# Patient Record
Sex: MALE | Race: BLACK OR AFRICAN AMERICAN | Employment: FULL TIME | URBAN - METROPOLITAN AREA
[De-identification: names, ages, dates, MRNs, and addresses within clinical notes are randomized per-mention and may not be internally consistent; named-entity substitution may affect disease eponyms.]

---

## 2017-01-01 ENCOUNTER — HOSPITAL ENCOUNTER (OUTPATIENT)
Dept: CT IMAGING | Age: 52
Discharge: HOME | End: 2017-08-22
Attending: INTERNAL MEDICINE | Admitting: INTERNAL MEDICINE

## 2017-01-01 ENCOUNTER — OFFICE VISIT (OUTPATIENT)
Dept: PULMONOLOGY | Age: 52
End: 2017-01-01

## 2017-01-01 ENCOUNTER — OFFICE VISIT (OUTPATIENT)
Dept: INTERNAL MEDICINE CLINIC | Age: 52
End: 2017-01-01

## 2017-01-01 ENCOUNTER — HOSPITAL ENCOUNTER (OUTPATIENT)
Dept: ENDOSCOPY | Age: 52
Discharge: HOME | End: 2017-10-20
Attending: INTERNAL MEDICINE | Admitting: INTERNAL MEDICINE

## 2017-01-01 ENCOUNTER — HOSPITAL ENCOUNTER (OUTPATIENT)
Dept: OTHER | Age: 52
Discharge: HOME | End: 2017-12-22
Attending: INTERNAL MEDICINE | Admitting: INTERNAL MEDICINE

## 2017-01-01 ENCOUNTER — OFFICE VISIT (OUTPATIENT)
Dept: CARDIOLOGY CLINIC | Age: 52
End: 2017-01-01

## 2017-01-01 ENCOUNTER — CARE COORDINATION (OUTPATIENT)
Dept: CASE MANAGEMENT | Age: 52
End: 2017-01-01

## 2017-01-01 ENCOUNTER — OFFICE VISIT (OUTPATIENT)
Dept: INFECTIOUS DISEASES | Age: 52
End: 2017-01-01

## 2017-01-01 ENCOUNTER — TELEPHONE (OUTPATIENT)
Dept: OTHER | Age: 52
End: 2017-01-01

## 2017-01-01 ENCOUNTER — HOSPITAL ENCOUNTER (OUTPATIENT)
Dept: CT IMAGING | Age: 52
Discharge: HOME | End: 2017-12-12
Attending: INTERNAL MEDICINE | Admitting: INTERNAL MEDICINE

## 2017-01-01 ENCOUNTER — HOSPITAL ENCOUNTER (OUTPATIENT)
Dept: OTHER | Age: 52
Discharge: HOME | End: 2017-12-31
Attending: INTERNAL MEDICINE | Admitting: INTERNAL MEDICINE

## 2017-01-01 ENCOUNTER — HOSPITAL ENCOUNTER (OUTPATIENT)
Dept: NON INVASIVE DIAGNOSTICS | Age: 52
Discharge: HOME | End: 2017-11-10
Attending: INTERNAL MEDICINE | Admitting: INTERNAL MEDICINE

## 2017-01-01 ENCOUNTER — HOSPITAL ENCOUNTER (OUTPATIENT)
Dept: NON INVASIVE DIAGNOSTICS | Age: 52
Discharge: HOME | End: 2017-08-22
Attending: INTERNAL MEDICINE | Admitting: INTERNAL MEDICINE

## 2017-01-01 ENCOUNTER — OFFICE VISIT (OUTPATIENT)
Dept: SURGERY | Age: 52
End: 2017-01-01

## 2017-01-01 ENCOUNTER — HOSPITAL ENCOUNTER (OUTPATIENT)
Dept: OTHER | Age: 52
Discharge: HOME | End: 2017-12-21
Attending: INTERNAL MEDICINE | Admitting: INTERNAL MEDICINE

## 2017-01-01 ENCOUNTER — SURG/PROC ORDERS (OUTPATIENT)
Dept: SURGERY | Age: 52
End: 2017-01-01

## 2017-01-01 ENCOUNTER — TELEPHONE (OUTPATIENT)
Dept: RHEUMATOLOGY | Age: 52
End: 2017-01-01

## 2017-01-01 VITALS
HEART RATE: 86 BPM | DIASTOLIC BLOOD PRESSURE: 82 MMHG | OXYGEN SATURATION: 94 % | WEIGHT: 246 LBS | BODY MASS INDEX: 35.22 KG/M2 | SYSTOLIC BLOOD PRESSURE: 100 MMHG | HEIGHT: 70 IN

## 2017-01-01 VITALS
HEART RATE: 72 BPM | SYSTOLIC BLOOD PRESSURE: 132 MMHG | WEIGHT: 246 LBS | DIASTOLIC BLOOD PRESSURE: 74 MMHG | BODY MASS INDEX: 35.3 KG/M2

## 2017-01-01 VITALS
WEIGHT: 245.8 LBS | HEART RATE: 88 BPM | DIASTOLIC BLOOD PRESSURE: 66 MMHG | SYSTOLIC BLOOD PRESSURE: 98 MMHG | HEIGHT: 70 IN | BODY MASS INDEX: 35.19 KG/M2

## 2017-01-01 VITALS
HEIGHT: 70 IN | BODY MASS INDEX: 36.22 KG/M2 | WEIGHT: 253 LBS | DIASTOLIC BLOOD PRESSURE: 60 MMHG | SYSTOLIC BLOOD PRESSURE: 110 MMHG

## 2017-01-01 VITALS
SYSTOLIC BLOOD PRESSURE: 99 MMHG | DIASTOLIC BLOOD PRESSURE: 80 MMHG | TEMPERATURE: 98 F | HEART RATE: 94 BPM | RESPIRATION RATE: 20 BRPM | OXYGEN SATURATION: 93 %

## 2017-01-01 VITALS
HEART RATE: 46 BPM | DIASTOLIC BLOOD PRESSURE: 64 MMHG | OXYGEN SATURATION: 94 % | WEIGHT: 246 LBS | BODY MASS INDEX: 35.3 KG/M2 | SYSTOLIC BLOOD PRESSURE: 110 MMHG

## 2017-01-01 VITALS
HEIGHT: 70 IN | SYSTOLIC BLOOD PRESSURE: 92 MMHG | OXYGEN SATURATION: 94 % | BODY MASS INDEX: 37.02 KG/M2 | DIASTOLIC BLOOD PRESSURE: 64 MMHG | HEART RATE: 100 BPM | WEIGHT: 258.6 LBS

## 2017-01-01 VITALS
HEIGHT: 70 IN | WEIGHT: 252 LBS | TEMPERATURE: 97.1 F | BODY MASS INDEX: 36.08 KG/M2 | SYSTOLIC BLOOD PRESSURE: 98 MMHG | DIASTOLIC BLOOD PRESSURE: 68 MMHG

## 2017-01-01 VITALS
HEIGHT: 70 IN | HEART RATE: 64 BPM | DIASTOLIC BLOOD PRESSURE: 74 MMHG | BODY MASS INDEX: 36.22 KG/M2 | WEIGHT: 253 LBS | SYSTOLIC BLOOD PRESSURE: 120 MMHG

## 2017-01-01 VITALS
BODY MASS INDEX: 34.9 KG/M2 | HEART RATE: 78 BPM | SYSTOLIC BLOOD PRESSURE: 100 MMHG | WEIGHT: 243.8 LBS | DIASTOLIC BLOOD PRESSURE: 62 MMHG | OXYGEN SATURATION: 98 % | RESPIRATION RATE: 16 BRPM | HEIGHT: 70 IN

## 2017-01-01 VITALS
BODY MASS INDEX: 34.65 KG/M2 | SYSTOLIC BLOOD PRESSURE: 120 MMHG | HEIGHT: 70 IN | HEART RATE: 88 BPM | DIASTOLIC BLOOD PRESSURE: 82 MMHG | WEIGHT: 242 LBS

## 2017-01-01 VITALS
SYSTOLIC BLOOD PRESSURE: 110 MMHG | DIASTOLIC BLOOD PRESSURE: 72 MMHG | WEIGHT: 255 LBS | HEART RATE: 88 BPM | BODY MASS INDEX: 36.59 KG/M2 | OXYGEN SATURATION: 96 %

## 2017-01-01 VITALS
HEART RATE: 56 BPM | WEIGHT: 250 LBS | BODY MASS INDEX: 35.79 KG/M2 | HEIGHT: 70 IN | SYSTOLIC BLOOD PRESSURE: 112 MMHG | DIASTOLIC BLOOD PRESSURE: 78 MMHG

## 2017-01-01 VITALS
DIASTOLIC BLOOD PRESSURE: 60 MMHG | SYSTOLIC BLOOD PRESSURE: 108 MMHG | HEIGHT: 70 IN | HEART RATE: 60 BPM | WEIGHT: 245.12 LBS | BODY MASS INDEX: 35.09 KG/M2

## 2017-01-01 VITALS
WEIGHT: 252 LBS | HEART RATE: 81 BPM | SYSTOLIC BLOOD PRESSURE: 133 MMHG | DIASTOLIC BLOOD PRESSURE: 71 MMHG | BODY MASS INDEX: 36.16 KG/M2

## 2017-01-01 DIAGNOSIS — I10 ESSENTIAL HYPERTENSION: ICD-10-CM

## 2017-01-01 DIAGNOSIS — B59 PNEUMONIA OF BOTH LUNGS DUE TO PNEUMOCYSTIS JIROVECII, UNSPECIFIED PART OF LUNG (HCC): ICD-10-CM

## 2017-01-01 DIAGNOSIS — I42.9 CARDIOMYOPATHY, UNSPECIFIED TYPE (HCC): Primary | ICD-10-CM

## 2017-01-01 DIAGNOSIS — R05.9 COUGH: ICD-10-CM

## 2017-01-01 DIAGNOSIS — I50.21 ACUTE SYSTOLIC CONGESTIVE HEART FAILURE (HCC): ICD-10-CM

## 2017-01-01 DIAGNOSIS — K21.9 GASTROESOPHAGEAL REFLUX DISEASE WITHOUT ESOPHAGITIS: ICD-10-CM

## 2017-01-01 DIAGNOSIS — I42.8 OTHER CARDIOMYOPATHY (HCC): Chronic | ICD-10-CM

## 2017-01-01 DIAGNOSIS — R06.02 SOB (SHORTNESS OF BREATH): ICD-10-CM

## 2017-01-01 DIAGNOSIS — N18.2 STAGE 2 CHRONIC KIDNEY DISEASE: ICD-10-CM

## 2017-01-01 DIAGNOSIS — B59 PNEUMONIA OF BOTH LUNGS DUE TO PNEUMOCYSTIS JIROVECII, UNSPECIFIED PART OF LUNG (HCC): Primary | ICD-10-CM

## 2017-01-01 DIAGNOSIS — K76.6 PAH (PULMONARY ARTERIAL HYPERTENSION) WITH PORTAL HYPERTENSION (HCC): ICD-10-CM

## 2017-01-01 DIAGNOSIS — R05.9 COUGH: Primary | ICD-10-CM

## 2017-01-01 DIAGNOSIS — I50.22 SYSTOLIC CHF, CHRONIC (HCC): ICD-10-CM

## 2017-01-01 DIAGNOSIS — C91.10 CHRONIC LYMPHOCYTIC LEUKEMIA OF B-CELL TYPE NOT HAVING ACHIEVED REMISSION (HCC): ICD-10-CM

## 2017-01-01 DIAGNOSIS — R16.0 ENLARGED LIVER: Primary | ICD-10-CM

## 2017-01-01 DIAGNOSIS — I10 ESSENTIAL HYPERTENSION: Chronic | ICD-10-CM

## 2017-01-01 DIAGNOSIS — I27.21 PAH (PULMONARY ARTERIAL HYPERTENSION) WITH PORTAL HYPERTENSION (HCC): ICD-10-CM

## 2017-01-01 DIAGNOSIS — I42.9 CARDIOMYOPATHY, UNSPECIFIED TYPE (HCC): ICD-10-CM

## 2017-01-01 DIAGNOSIS — R06.02 SHORTNESS OF BREATH: ICD-10-CM

## 2017-01-01 DIAGNOSIS — R42 DIZZINESS: ICD-10-CM

## 2017-01-01 DIAGNOSIS — R16.0 ENLARGED LIVER: ICD-10-CM

## 2017-01-01 DIAGNOSIS — E78.49 OTHER HYPERLIPIDEMIA: Chronic | ICD-10-CM

## 2017-01-01 DIAGNOSIS — I49.9 IRREGULAR HEART RATE: Primary | ICD-10-CM

## 2017-01-01 DIAGNOSIS — I50.22 CHRONIC SYSTOLIC CONGESTIVE HEART FAILURE (HCC): Primary | ICD-10-CM

## 2017-01-01 DIAGNOSIS — I26.02 ACUTE SADDLE PULMONARY EMBOLISM WITH ACUTE COR PULMONALE (HCC): ICD-10-CM

## 2017-01-01 DIAGNOSIS — C91.10 CHRONIC LYMPHOCYTIC LEUKEMIA OF B-CELL TYPE NOT HAVING ACHIEVED REMISSION (HCC): Primary | ICD-10-CM

## 2017-01-01 DIAGNOSIS — E66.9 OBESITY (BMI 30-39.9): Chronic | ICD-10-CM

## 2017-01-01 DIAGNOSIS — E78.5 HYPERLIPIDEMIA, UNSPECIFIED HYPERLIPIDEMIA TYPE: Chronic | ICD-10-CM

## 2017-01-01 DIAGNOSIS — E78.5 HYPERLIPIDEMIA, UNSPECIFIED HYPERLIPIDEMIA TYPE: Primary | Chronic | ICD-10-CM

## 2017-01-01 DIAGNOSIS — I50.22 CHRONIC SYSTOLIC CONGESTIVE HEART FAILURE (HCC): ICD-10-CM

## 2017-01-01 DIAGNOSIS — I50.22 SYSTOLIC CHF, CHRONIC (HCC): Chronic | ICD-10-CM

## 2017-01-01 DIAGNOSIS — I42.9 CARDIOMYOPATHY (HCC): ICD-10-CM

## 2017-01-01 DIAGNOSIS — I50.22 SYSTOLIC CHF, CHRONIC (HCC): Primary | ICD-10-CM

## 2017-01-01 DIAGNOSIS — Z71.3 ENCOUNTER FOR WEIGHT LOSS COUNSELING: ICD-10-CM

## 2017-01-01 DIAGNOSIS — I10 ESSENTIAL HYPERTENSION: Primary | Chronic | ICD-10-CM

## 2017-01-01 DIAGNOSIS — J40 BRONCHITIS: Primary | ICD-10-CM

## 2017-01-01 DIAGNOSIS — I27.21 PAH (PULMONARY ARTERIAL HYPERTENSION) WITH PORTAL HYPERTENSION (HCC): Chronic | ICD-10-CM

## 2017-01-01 DIAGNOSIS — I42.9 CARDIOMYOPATHY, IDIOPATHIC (HCC): Chronic | ICD-10-CM

## 2017-01-01 DIAGNOSIS — R05.3 CHRONIC COUGH: ICD-10-CM

## 2017-01-01 DIAGNOSIS — I50.22 SYSTOLIC CHF, CHRONIC (HCC): Primary | Chronic | ICD-10-CM

## 2017-01-01 DIAGNOSIS — R60.0 EDEMA OF BOTH LEGS: ICD-10-CM

## 2017-01-01 DIAGNOSIS — I34.0 NON-RHEUMATIC MITRAL REGURGITATION: ICD-10-CM

## 2017-01-01 DIAGNOSIS — D72.820 LYMPHOCYTOSIS: ICD-10-CM

## 2017-01-01 DIAGNOSIS — K76.6 PAH (PULMONARY ARTERIAL HYPERTENSION) WITH PORTAL HYPERTENSION (HCC): Chronic | ICD-10-CM

## 2017-01-01 DIAGNOSIS — R06.02 SOB (SHORTNESS OF BREATH): Primary | ICD-10-CM

## 2017-01-01 DIAGNOSIS — J84.9 ILD (INTERSTITIAL LUNG DISEASE) (HCC): Primary | ICD-10-CM

## 2017-01-01 LAB
A/G RATIO: 1.3 (ref 1.1–2.2)
A/G RATIO: 1.5 (ref 1.1–2.2)
A/G RATIO: 1.7 (ref 1.1–2.2)
ADENOVIRUS SPECIES BE: NOT DETECTED
ADENOVIRUS SPECIES C: NOT DETECTED
AFB CULTURE (MYCOBACTERIA): NORMAL
AFB SMEAR: NORMAL
ALBUMIN SERPL-MCNC: 3.6 G/DL (ref 3.4–5)
ALBUMIN SERPL-MCNC: 3.8 G/DL (ref 3.4–5)
ALBUMIN SERPL-MCNC: 4 G/DL (ref 3.4–5)
ALP BLD-CCNC: 28 U/L (ref 40–129)
ALP BLD-CCNC: 31 U/L (ref 40–129)
ALP BLD-CCNC: 32 U/L (ref 40–129)
ALT SERPL-CCNC: 10 U/L (ref 10–40)
ALT SERPL-CCNC: 33 U/L (ref 10–40)
ALT SERPL-CCNC: 49 U/L (ref 10–40)
AMYLASE: 45 U/L (ref 25–115)
ANION GAP SERPL CALCULATED.3IONS-SCNC: 12 MMOL/L (ref 3–16)
ANION GAP SERPL CALCULATED.3IONS-SCNC: 13 MMOL/L (ref 3–16)
ANION GAP SERPL CALCULATED.3IONS-SCNC: 15 MMOL/L (ref 3–16)
ANION GAP SERPL CALCULATED.3IONS-SCNC: 15 MMOL/L (ref 3–16)
ANION GAP SERPL CALCULATED.3IONS-SCNC: 17 MMOL/L (ref 3–16)
ANION GAP SERPL CALCULATED.3IONS-SCNC: 18 MMOL/L (ref 3–16)
APPEARANCE BAL (LAVAGE): ABNORMAL
APTT: 33.7 SEC (ref 24.1–34.9)
AST SERPL-CCNC: 17 U/L (ref 15–37)
AST SERPL-CCNC: 29 U/L (ref 15–37)
AST SERPL-CCNC: 37 U/L (ref 15–37)
ATYPICAL LYMPHOCYTE RELATIVE PERCENT: 4 % (ref 0–6)
BANDED NEUTROPHILS RELATIVE PERCENT: 1 % (ref 0–7)
BASOPHILS ABSOLUTE: 0 K/UL (ref 0–0.2)
BASOPHILS ABSOLUTE: 0 K/UL (ref 0–0.2)
BASOPHILS RELATIVE PERCENT: 0 %
BASOPHILS RELATIVE PERCENT: 0 %
BILIRUB SERPL-MCNC: 1 MG/DL (ref 0–1)
BILIRUB SERPL-MCNC: 1.5 MG/DL (ref 0–1)
BILIRUB SERPL-MCNC: 2.6 MG/DL (ref 0–1)
BUN BLDV-MCNC: 16 MG/DL (ref 7–20)
BUN BLDV-MCNC: 17 MG/DL (ref 7–20)
BUN BLDV-MCNC: 17 MG/DL (ref 7–20)
BUN BLDV-MCNC: 18 MG/DL (ref 7–20)
BUN BLDV-MCNC: 23 MG/DL (ref 7–20)
BUN BLDV-MCNC: 26 MG/DL (ref 7–20)
CALCIUM SERPL-MCNC: 9 MG/DL (ref 8.3–10.6)
CALCIUM SERPL-MCNC: 9.1 MG/DL (ref 8.3–10.6)
CALCIUM SERPL-MCNC: 9.1 MG/DL (ref 8.3–10.6)
CALCIUM SERPL-MCNC: 9.2 MG/DL (ref 8.3–10.6)
CHLORIDE BLD-SCNC: 100 MMOL/L (ref 99–110)
CHLORIDE BLD-SCNC: 101 MMOL/L (ref 99–110)
CHLORIDE BLD-SCNC: 103 MMOL/L (ref 99–110)
CHLORIDE BLD-SCNC: 104 MMOL/L (ref 99–110)
CLOT EVALUATION BAL: ABNORMAL
CMV DNA QNT PCR: <2.6 LOG CPY/ML
CMV DNA QUANTATATIVE INTERPRETATION: <2.4 LOG IU/ML
CMV DNA QUANTATATIVE INTERPRETATION: NOT DETECTED
CMV DNA QUANTITATIVE: <227 IU/ML
CMV SOURCE: NORMAL
CMVQ COPY/ML: <390 CPY/ML
CO2: 23 MMOL/L (ref 21–32)
CO2: 26 MMOL/L (ref 21–32)
CO2: 27 MMOL/L (ref 21–32)
CO2: 27 MMOL/L (ref 21–32)
COLOR LAVAGE: COLORLESS
CREAT SERPL-MCNC: 1.2 MG/DL (ref 0.9–1.3)
CREAT SERPL-MCNC: 1.3 MG/DL (ref 0.9–1.3)
CREAT SERPL-MCNC: 1.4 MG/DL (ref 0.9–1.3)
CREAT SERPL-MCNC: 1.4 MG/DL (ref 0.9–1.3)
CULTURE, RESPIRATORY: ABNORMAL
DIGOXIN LEVEL: 0.5 NG/ML (ref 0.8–2)
EKG ATRIAL RATE: 105 BPM
EKG DIAGNOSIS: NORMAL
EKG P AXIS: 50 DEGREES
EKG P-R INTERVAL: 172 MS
EKG Q-T INTERVAL: 372 MS
EKG QRS DURATION: 98 MS
EKG QTC CALCULATION (BAZETT): 494 MS
EKG R AXIS: -21 DEGREES
EKG T AXIS: 79 DEGREES
EKG VENTRICULAR RATE: 106 BPM
EOSINOPHILS ABSOLUTE: 0 K/UL (ref 0–0.6)
EOSINOPHILS ABSOLUTE: 0 K/UL (ref 0–0.6)
EOSINOPHILS RELATIVE PERCENT: 0 %
EOSINOPHILS RELATIVE PERCENT: 0 %
FUNGUS (MYCOLOGY) CULTURE: ABNORMAL
FUNGUS (MYCOLOGY) CULTURE: ABNORMAL
FUNGUS (MYCOLOGY) CULTURE: NORMAL
FUNGUS STAIN: ABNORMAL
FUNGUS STAIN: NORMAL
GFR AFRICAN AMERICAN: >60
GFR NON-AFRICAN AMERICAN: 53
GFR NON-AFRICAN AMERICAN: 53
GFR NON-AFRICAN AMERICAN: 57
GFR NON-AFRICAN AMERICAN: 58
GFR NON-AFRICAN AMERICAN: 58
GFR NON-AFRICAN AMERICAN: >60
GLOBULIN: 2.4 G/DL
GLOBULIN: 2.5 G/DL
GLOBULIN: 2.8 G/DL
GLUCOSE BLD-MCNC: 100 MG/DL (ref 70–99)
GLUCOSE BLD-MCNC: 101 MG/DL (ref 70–99)
GLUCOSE BLD-MCNC: 116 MG/DL (ref 70–99)
GLUCOSE BLD-MCNC: 83 MG/DL (ref 70–99)
GLUCOSE BLD-MCNC: 89 MG/DL (ref 70–99)
GLUCOSE BLD-MCNC: 99 MG/DL (ref 70–99)
GRAM STAIN RESULT: ABNORMAL
GRAM STAIN RESULT: ABNORMAL
HCT VFR BLD CALC: 37.8 % (ref 40.5–52.5)
HCT VFR BLD CALC: 39.2 % (ref 40.5–52.5)
HEMATOLOGY PATH CONSULT: NO
HEMATOLOGY PATH CONSULT: YES
HEMOGLOBIN: 11.4 G/DL (ref 13.5–17.5)
HEMOGLOBIN: 11.5 G/DL (ref 13.5–17.5)
HIV-1 AND HIV-2 ANTIBODIES: NORMAL
HUMAN METAPNEUMOVIRUS PCR: NOT DETECTED
INFLUENZA A BY PCR: NOT DETECTED
INFLUENZA A H1 (2009) PCR: NOT DETECTED
INFLUENZA A H1 (2009) PCR: NOT DETECTED
INFLUENZA A H3 PCR: NOT DETECTED
INFLUENZA B BY PCR: NOT DETECTED
INR BLD: 1.35 (ref 0.85–1.15)
LACTATE DEHYDROGENASE: 262 U/L (ref 100–190)
LEFT VENTRICULAR EJECTION FRACTION HIGH VALUE: 40 %
LEFT VENTRICULAR EJECTION FRACTION MODE: NORMAL
LIPASE: 29 U/L (ref 13–60)
LV EF: 26 %
LV EF: 40 %
LVEF MODALITY: NORMAL
LVEF MODALITY: NORMAL
LYMPHOCYTES ABSOLUTE: 120.2 K/UL (ref 1–5.1)
LYMPHOCYTES ABSOLUTE: 122.6 K/UL (ref 1–5.1)
LYMPHOCYTES RELATIVE PERCENT: 91 %
LYMPHOCYTES RELATIVE PERCENT: 98 %
LYMPHOCYTES, BAL: 13 % (ref 5–10)
Lab: NORMAL
MACROPHAGES, BAL: 83 % (ref 90–95)
MCH RBC QN AUTO: 29.1 PG (ref 26–34)
MCH RBC QN AUTO: 29.4 PG (ref 26–34)
MCHC RBC AUTO-ENTMCNC: 29.4 G/DL (ref 31–36)
MCHC RBC AUTO-ENTMCNC: 30.1 G/DL (ref 31–36)
MCV RBC AUTO: 97.7 FL (ref 80–100)
MCV RBC AUTO: 99 FL (ref 80–100)
MISCELLANEOUS LAB TEST ORDER: NORMAL
MISCELLANEOUS LAB TEST ORDER: NORMAL
MISCELLANEOUS LAB TEST RESULT: NORMAL
MONOCYTES ABSOLUTE: 0 K/UL (ref 0–1.3)
MONOCYTES ABSOLUTE: 0 K/UL (ref 0–1.3)
MONOCYTES RELATIVE PERCENT: 0 %
MONOCYTES RELATIVE PERCENT: 0 %
MONOCYTES, BAL: 1 %
MONONUCLEAR UNIDENTIFIED CELLS FLUID BAL: 1 %
NEUTROPHILS ABSOLUTE: 2.5 K/UL (ref 1.7–7.7)
NEUTROPHILS ABSOLUTE: 6.5 K/UL (ref 1.7–7.7)
NEUTROPHILS RELATIVE PERCENT: 2 %
NEUTROPHILS RELATIVE PERCENT: 4 %
NUMBER OF CELLS COUNTED BAL (LAVAGE): 200
ORGANISM: ABNORMAL
PARAINFLUENZA 2: NOT DETECTED
PARAINFLUENZA 3: NOT DETECTED
PARAINFLUENZA1: NOT DETECTED
PATH CONSULT FLUID: NORMAL
PATH REVIEW BAL (LAVAGE): YES
PDW BLD-RTO: 24.2 % (ref 12.4–15.4)
PDW BLD-RTO: 24.9 % (ref 12.4–15.4)
PLATELET # BLD: 156 K/UL (ref 135–450)
PLATELET # BLD: 165 K/UL (ref 135–450)
PLATELET SLIDE REVIEW: ADEQUATE
PMV BLD AUTO: 9.5 FL (ref 5–10.5)
PMV BLD AUTO: 9.8 FL (ref 5–10.5)
POTASSIUM SERPL-SCNC: 3.2 MMOL/L (ref 3.5–5.1)
POTASSIUM SERPL-SCNC: 3.2 MMOL/L (ref 3.5–5.1)
POTASSIUM SERPL-SCNC: 3.3 MMOL/L (ref 3.5–5.1)
POTASSIUM SERPL-SCNC: 3.7 MMOL/L (ref 3.5–5.1)
POTASSIUM SERPL-SCNC: 4.6 MMOL/L (ref 3.5–5.1)
POTASSIUM SERPL-SCNC: 6 MMOL/L (ref 3.5–5.1)
PRO-BNP: 3897 PG/ML (ref 0–124)
PRO-BNP: 4929 PG/ML (ref 0–124)
PRO-BNP: 5227 PG/ML (ref 0–124)
PRO-BNP: 5744 PG/ML (ref 0–124)
PROTHROMBIN TIME: 15.3 SEC (ref 9.6–13)
RBC # BLD: 3.87 M/UL (ref 4.2–5.9)
RBC # BLD: 3.96 M/UL (ref 4.2–5.9)
RBC, BAL: 2100 /CUMM
REPORT: NORMAL
RHINOVIRUS RNA XXX PCR: NOT DETECTED
RSV A AB BY PCR: NOT DETECTED
RSV B AB BY PCR: NOT DETECTED
RVP SOURCE: NORMAL
SEGMENTED NEUTROPHILS, BAL: 2 % (ref 5–10)
SLIDE REVIEW: ABNORMAL
SODIUM BLD-SCNC: 140 MMOL/L (ref 136–145)
SODIUM BLD-SCNC: 142 MMOL/L (ref 136–145)
SODIUM BLD-SCNC: 144 MMOL/L (ref 136–145)
SODIUM BLD-SCNC: 145 MMOL/L (ref 136–145)
THIS TEST SENT TO: NORMAL
TOTAL PROTEIN: 6.3 G/DL (ref 6.4–8.2)
TOTAL PROTEIN: 6.4 G/DL (ref 6.4–8.2)
TOTAL PROTEIN: 6.4 G/DL (ref 6.4–8.2)
URIC ACID, SERUM: 12.3 MG/DL (ref 3.5–7.2)
VARICELLA-ZOSTER, PCR: NOT DETECTED
VZ SOURCE: NORMAL
WBC # BLD: 122.7 K/UL (ref 4–11)
WBC # BLD: 129.1 K/UL (ref 4–11)
WBC/EPI CELLS BAL: 405 /CUMM

## 2017-01-01 PROCEDURE — 99203 OFFICE O/P NEW LOW 30 MIN: CPT | Performed by: SURGERY

## 2017-01-01 PROCEDURE — 99244 OFF/OP CNSLTJ NEW/EST MOD 40: CPT | Performed by: INTERNAL MEDICINE

## 2017-01-01 PROCEDURE — 99213 OFFICE O/P EST LOW 20 MIN: CPT | Performed by: INTERNAL MEDICINE

## 2017-01-01 PROCEDURE — 99215 OFFICE O/P EST HI 40 MIN: CPT | Performed by: INTERNAL MEDICINE

## 2017-01-01 PROCEDURE — 99245 OFF/OP CONSLTJ NEW/EST HI 55: CPT | Performed by: INTERNAL MEDICINE

## 2017-01-01 PROCEDURE — 93000 ELECTROCARDIOGRAM COMPLETE: CPT | Performed by: INTERNAL MEDICINE

## 2017-01-01 PROCEDURE — 99214 OFFICE O/P EST MOD 30 MIN: CPT | Performed by: INTERNAL MEDICINE

## 2017-01-01 PROCEDURE — 93010 ELECTROCARDIOGRAM REPORT: CPT | Performed by: INTERNAL MEDICINE

## 2017-01-01 RX ORDER — SPIRONOLACTONE 50 MG/1
50 TABLET, FILM COATED ORAL DAILY
Qty: 30 TABLET | Refills: 5 | Status: ON HOLD | OUTPATIENT
Start: 2017-01-01 | End: 2018-01-01 | Stop reason: HOSPADM

## 2017-01-01 RX ORDER — ATOVAQUONE 750 MG/5ML
750 SUSPENSION ORAL EVERY 12 HOURS
Qty: 210 ML | Refills: 0 | Status: SHIPPED | OUTPATIENT
Start: 2017-01-01 | End: 2017-01-01

## 2017-01-01 RX ORDER — POTASSIUM CHLORIDE 750 MG/1
10 TABLET, EXTENDED RELEASE ORAL 2 TIMES DAILY
Qty: 60 TABLET | Refills: 3 | Status: SHIPPED | OUTPATIENT
Start: 2017-01-01 | End: 2017-01-01 | Stop reason: ALTCHOICE

## 2017-01-01 RX ORDER — BENZONATATE 100 MG/1
100 CAPSULE ORAL 3 TIMES DAILY PRN
Qty: 30 CAPSULE | Refills: 0 | Status: SHIPPED | OUTPATIENT
Start: 2017-01-01 | End: 2017-01-01

## 2017-01-01 RX ORDER — PREDNISONE 20 MG/1
20 TABLET ORAL DAILY
Qty: 5 TABLET | Refills: 0 | Status: SHIPPED | OUTPATIENT
Start: 2017-01-01 | End: 2017-01-01

## 2017-01-01 RX ORDER — SPIRONOLACTONE 25 MG/1
25 TABLET ORAL DAILY
Qty: 30 TABLET | Refills: 11 | Status: SHIPPED | OUTPATIENT
Start: 2017-01-01 | End: 2017-01-01 | Stop reason: SDUPTHER

## 2017-01-01 RX ORDER — POTASSIUM CHLORIDE 750 MG/1
10 TABLET, EXTENDED RELEASE ORAL 2 TIMES DAILY
Qty: 60 TABLET | Refills: 3 | Status: ON HOLD | OUTPATIENT
Start: 2017-01-01 | End: 2018-01-01 | Stop reason: HOSPADM

## 2017-01-01 RX ORDER — FUROSEMIDE 40 MG/1
40 TABLET ORAL DAILY
Qty: 60 TABLET | Refills: 3 | Status: ON HOLD | OUTPATIENT
Start: 2017-01-01 | End: 2017-01-01 | Stop reason: HOSPADM

## 2017-01-01 RX ORDER — DOXYCYCLINE 100 MG/10ML
100 INJECTION, POWDER, LYOPHILIZED, FOR SOLUTION INTRAVENOUS ONCE
Qty: 100 MG | Refills: 0 | Status: SHIPPED | OUTPATIENT
Start: 2017-01-01 | End: 2017-01-01

## 2017-01-01 RX ORDER — FUROSEMIDE 40 MG/1
40 TABLET ORAL 2 TIMES DAILY
Qty: 180 TABLET | Refills: 3 | Status: ON HOLD | OUTPATIENT
Start: 2017-01-01 | End: 2018-01-01 | Stop reason: HOSPADM

## 2017-01-01 RX ORDER — OMEPRAZOLE 40 MG/1
40 CAPSULE, DELAYED RELEASE ORAL
Qty: 30 CAPSULE | Refills: 3 | Status: SHIPPED | OUTPATIENT
Start: 2017-01-01 | End: 2017-01-01

## 2017-01-01 RX ORDER — FUROSEMIDE 20 MG/1
20 TABLET ORAL DAILY
Qty: 30 TABLET | Refills: 0 | Status: SHIPPED | OUTPATIENT
Start: 2017-01-01 | End: 2017-01-01 | Stop reason: DRUGHIGH

## 2017-01-01 RX ORDER — VALSARTAN 80 MG/1
40 TABLET ORAL DAILY
Qty: 30 TABLET | Refills: 5 | Status: SHIPPED | OUTPATIENT
Start: 2017-01-01 | End: 2018-01-01 | Stop reason: SDUPTHER

## 2017-01-01 RX ORDER — PREDNISONE 20 MG/1
20 TABLET ORAL DAILY
Qty: 10 TABLET | Refills: 0 | Status: SHIPPED | OUTPATIENT
Start: 2017-01-01 | End: 2017-01-01

## 2017-01-01 RX ORDER — PROMETHAZINE HYDROCHLORIDE AND CODEINE PHOSPHATE 6.25; 1 MG/5ML; MG/5ML
10 SYRUP ORAL NIGHTLY PRN
Qty: 120 ML | Refills: 0 | Status: SHIPPED | OUTPATIENT
Start: 2017-01-01 | End: 2017-01-01

## 2017-01-01 RX ORDER — SODIUM CHLORIDE 0.9 % (FLUSH) 0.9 %
10 SYRINGE (ML) INJECTION PRN
Status: CANCELLED | OUTPATIENT
Start: 2017-01-01

## 2017-01-01 RX ORDER — FUROSEMIDE 40 MG/1
40 TABLET ORAL DAILY
Qty: 90 TABLET | Refills: 3 | Status: SHIPPED | OUTPATIENT
Start: 2017-01-01 | End: 2017-01-01 | Stop reason: SDUPTHER

## 2017-01-01 RX ORDER — SODIUM POLYSTYRENE SULFONATE 15 G/60ML
15 SUSPENSION ORAL; RECTAL ONCE
Qty: 1 BOTTLE | Refills: 3 | Status: ON HOLD | OUTPATIENT
Start: 2017-01-01 | End: 2017-01-01 | Stop reason: HOSPADM

## 2017-01-01 RX ORDER — IPRATROPIUM BROMIDE AND ALBUTEROL SULFATE 2.5; .5 MG/3ML; MG/3ML
1 SOLUTION RESPIRATORY (INHALATION)
Status: DISCONTINUED | OUTPATIENT
Start: 2017-01-01 | End: 2017-01-01

## 2017-01-01 RX ORDER — DOXYCYCLINE HYCLATE 100 MG
100 TABLET ORAL 2 TIMES DAILY
Qty: 20 TABLET | Refills: 0 | Status: SHIPPED | OUTPATIENT
Start: 2017-01-01 | End: 2017-01-01

## 2017-01-01 RX ORDER — VALSARTAN AND HYDROCHLOROTHIAZIDE 320; 12.5 MG/1; MG/1
1 TABLET, FILM COATED ORAL DAILY
Qty: 90 TABLET | Refills: 3 | Status: SHIPPED | OUTPATIENT
Start: 2017-01-01 | End: 2017-01-01 | Stop reason: ALTCHOICE

## 2017-01-01 RX ORDER — LEVOFLOXACIN 500 MG/1
500 TABLET, FILM COATED ORAL DAILY
Qty: 10 TABLET | Refills: 0 | Status: SHIPPED | OUTPATIENT
Start: 2017-01-01 | End: 2017-01-01

## 2017-01-01 RX ORDER — ONDANSETRON 4 MG/1
4 TABLET, FILM COATED ORAL EVERY 6 HOURS PRN
Qty: 20 TABLET | Refills: 0 | Status: SHIPPED | OUTPATIENT
Start: 2017-01-01 | End: 2017-01-01 | Stop reason: ALTCHOICE

## 2017-01-01 RX ORDER — VALSARTAN AND HYDROCHLOROTHIAZIDE 320; 12.5 MG/1; MG/1
1 TABLET, FILM COATED ORAL DAILY
Qty: 90 TABLET | Refills: 3 | Status: SHIPPED | OUTPATIENT
Start: 2017-01-01 | End: 2017-01-01 | Stop reason: SDUPTHER

## 2017-01-01 RX ORDER — PROMETHAZINE HYDROCHLORIDE 25 MG/1
25 TABLET ORAL EVERY 6 HOURS PRN
Qty: 20 TABLET | Refills: 0 | Status: SHIPPED | OUTPATIENT
Start: 2017-01-01 | End: 2017-01-01

## 2017-01-01 RX ORDER — SODIUM CHLORIDE 0.9 % (FLUSH) 0.9 %
10 SYRINGE (ML) INJECTION EVERY 12 HOURS SCHEDULED
Status: CANCELLED | OUTPATIENT
Start: 2017-01-01

## 2017-01-01 RX ORDER — FUROSEMIDE 20 MG/1
20 TABLET ORAL DAILY
Qty: 30 TABLET | Refills: 0 | Status: CANCELLED | OUTPATIENT
Start: 2017-01-01

## 2017-01-01 ASSESSMENT — ENCOUNTER SYMPTOMS
ABDOMINAL PAIN: 0
DIARRHEA: 0
EYE ITCHING: 0
HEMOPTYSIS: 0
EYE REDNESS: 0
SORE THROAT: 0
ABDOMINAL PAIN: 0
WHEEZING: 0
NAUSEA: 1
CONSTIPATION: 0
EYE DISCHARGE: 0
WHEEZING: 0
SHORTNESS OF BREATH: 1
ABDOMINAL DISTENTION: 0
SHORTNESS OF BREATH: 1
DOUBLE VISION: 0
BACK PAIN: 0
EYE DISCHARGE: 0
SPUTUM PRODUCTION: 0
SHORTNESS OF BREATH: 1
SHORTNESS OF BREATH: 0
COUGH: 1
COUGH: 1
BLURRED VISION: 0
BACK PAIN: 0

## 2017-04-08 ENCOUNTER — HOSPITAL ENCOUNTER (OUTPATIENT)
Dept: CT IMAGING | Age: 52
Discharge: HOME | End: 2017-04-08
Attending: INTERNAL MEDICINE | Admitting: INTERNAL MEDICINE

## 2017-04-08 DIAGNOSIS — C91.10 CHRONIC LYMPHOCYTIC LEUKEMIA OF B-CELL TYPE NOT HAVING ACHIEVED REMISSION (HCC): ICD-10-CM

## 2017-10-06 PROBLEM — I34.0 NON-RHEUMATIC MITRAL REGURGITATION: Status: ACTIVE | Noted: 2017-01-01

## 2017-10-06 PROBLEM — I50.21 ACUTE SYSTOLIC CONGESTIVE HEART FAILURE (HCC): Status: ACTIVE | Noted: 2017-01-01

## 2017-10-06 PROBLEM — K76.6 PAH (PULMONARY ARTERIAL HYPERTENSION) WITH PORTAL HYPERTENSION (HCC): Status: ACTIVE | Noted: 2017-01-01

## 2017-10-06 PROBLEM — I27.21 PAH (PULMONARY ARTERIAL HYPERTENSION) WITH PORTAL HYPERTENSION (HCC): Status: ACTIVE | Noted: 2017-01-01

## 2017-10-06 PROBLEM — R06.02 SOB (SHORTNESS OF BREATH): Status: ACTIVE | Noted: 2017-01-01

## 2017-10-06 NOTE — MR AVS SNAPSHOT
After Visit Summary             Juan Jose Lara   10/6/2017 10:30 AM   Office Visit    Description:  Male : 1965   Provider:  Shabbir Jean MD   Department:  [address] [place] [address] and Future Appointments         Below is a list of your follow-up and future appointments. This may not be a complete list as you may have made appointments directly with providers that we are not aware of or your providers may have made some for you. Please call your providers to confirm appointments. It is important to keep your appointments. Please bring your current insurance card, photo ID, co-pay, and all medication bottles to your appointment. If self-pay, payment is expected at the time of service. Your To-Do List     Future Appointments Provider Department Dept Phone    10/18/2017 4:00 PM Diana Goldman MD; [facility] [address]  [phone]    10/23/2017 8:00 AM Naty Bartholomew DO [facility] Cardiology - [facility] [phone]    Please arrive 15 minutes prior to scheduled appointment time to complete paper work, bring photo ID and insurance cards. 2017 3:15 PM Kaelyn Hurst MD [facility] Internal Medicine [phone]    Please arrive 15 minutes prior to appointment, bring photo ID and insurance card.          Information from Your Visit        Department     Name Address Phone Fax    Leonidas [address] [place] [address] ,[address] [address] [address] [facility] [address] [phone]      You Were Seen for:         Comments    Chronic lymphocytic leukemia of B-cell type not having achieved remission [facility])   [[id number]]         Vital Signs     Blood Pressure Height Weight Body Mass Index Smoking Status       110/60 5' 10\" (1.778 m) 253 lb (114.8 kg) 36.3 kg/m2 Never Smoker       Additional Information about your Body Mass Index (BMI) Your BMI as listed above is considered obese (30 or more). BMI is an estimate of body fat, calculated from your height and weight. The higher your BMI, the greater your risk of heart disease, high blood pressure, type 2 diabetes, stroke, gallstones, arthritis, sleep apnea, and certain cancers. BMI is not perfect. It may overestimate body fat in athletes and people who are more muscular. Even a small weight loss (between 5 and 10 percent of your current weight) by decreasing your calorie intake and becoming more physically active will help lower your risk of developing or worsening diseases associated with obesity. Learn more at: [employer]          Instructions    1. We discussed the risks of port placement including bleeding, infection, pneumothorax and blood clots. Benefits include the ability to give chemotherapy and easy blood draws. Alternatives include continued medical management and possible palliative care. Details of the procedure were discussed and all questions answered. He understands, agrees, and wishes to proceed  2. Chemotherapy is anticipated to be started within 48 hours and we will leave the port accessed  3. Will schedule for port placement with with local anesthetic with sedation as outpatient procedure to be performed at the hospital             Today's Medication Changes          These changes are accurate as of: 10/6/17 12:04 PM.  If you have any questions, ask your nurse or doctor.                STOP taking these medications           omeprazole 40 MG delayed release capsule   Commonly known as:  PRILOSEC   Stopped by:  Jaqueline Fierro MD       rosuvastatin 10 MG tablet   Commonly known as:  CRESTOR   Stopped by:  Jaqueline Fierro MD               Your Current Medications Are              sodium polystyrene (KAYEXALATE) 15 GM/60ML suspension Take 60 mLs by mouth once for 1 dose valsartan-hydrochlorothiazide (DIOVAN HCT) 320-12.5 MG per tablet Take 1 tablet by mouth daily      Allergies           No Known Allergies         Additional Information        Basic Information     Date Of Birth Sex Race Ethnicity Preferred Language    1965 Male Black Non-/Non  English      Problem List as of 10/6/2017  Date Reviewed: 10/6/2017                Chronic lymphocytic leukemia of B-cell type not having achieved remission ([facility] [address]    Obesity (BMI 30-39.9) (Chronic)    Hyperlipidemia (Chronic)    Essential hypertension (Chronic)      Immunizations as of 10/6/2017     Name Date    Tdap (Boostrix, Adacel) 1/7/2013      Preventive Care        Date Due    Diabetes Screening 4/30/2004    Yearly Flu Vaccine (1) 12/23/2017 (Originally 9/1/2017)    Pneumococcal Vaccines (two) for adults aged 19-64  years who are immunocompromised or whose spleen is missing or not working  (1 of 3 - PCV13) 12/23/2017 (Originally 4/30/1983)    Cholesterol Screening 6/26/2020    Tetanus Combination Vaccine (2 - Td) 1/7/2023    Colonoscopy 7/9/2024            [employer] Signup           Our records indicate that you have an active [employer] account. You can view your After Visit Summary by going to [email or web address] and logging in with your [employer] username and password. If you don't have a [employer] username and password but a parent or guardian has access to your record, the parent or guardian should login with their own [employer] username and password and access your record to view the After Visit Summary. Additional Information  If you have questions, please contact the physician practice where you receive care. Remember, [employer] is NOT to be used for urgent needs. For medical emergencies, dial 911. For questions regarding your [employer] account call [phone]. If you have a clinical question, please call your doctor's office.

## 2017-10-06 NOTE — PROGRESS NOTES
[facility]  SUBJECTIVE:    Rikki Evans   1965   47 y.o. male is referred by his medical oncologist Dr. Milan Chery with CLL and needing a port placed for chemotherapy. He's had CLL for some time but recently developed shortness of breath and fatigue. He has significant leukocytosis and would be helped with chemotherapy. He denies fevers chills nausea vomiting change in bowel movements or signs of active infection. He's never had chest or neck surgery. He does not smoke and has no other significant medical conditions    Review of Systems   Constitutional: Positive for fatigue. Negative for activity change. HENT: Negative for congestion and dental problem. Eyes: Negative for discharge and itching. Respiratory: Positive for cough and shortness of breath. Cardiovascular: Negative for chest pain and leg swelling. Gastrointestinal: Negative for abdominal distention and abdominal pain. Endocrine: Negative for cold intolerance and heat intolerance. Genitourinary: Negative for difficulty urinating and dysuria. Musculoskeletal: Negative for arthralgias and back pain. Allergic/Immunologic: Negative for environmental allergies and food allergies. Neurological: Positive for weakness and light-headedness. Hematological: Negative for adenopathy. Does not bruise/bleed easily. Psychiatric/Behavioral: Negative for agitation and behavioral problems. Past Medical History:   Diagnosis Date    CLL (chronic lymphocytic leukemia) ([facility] [address]     Hypertension     Lymphoid granulomatosis ([facility] [address]     Onychomycosis      Past Surgical History:   Procedure Laterality Date    EYE SURGERY       Social History     Social History    Marital status:      Spouse name: N/A    Number of children: N/A    Years of education: N/A     Occupational History    Not on file.      Social History Main Topics    Smoking status: Never Smoker    Smokeless tobacco: Never as outpatient procedure to be performed at the hospital   4. The patient is not currently smoking. Recommend maintaining a smoke-free lifestyle. Austin Christine 6  Cam Merino MD, FACS  10/6/2017  11:33 AM

## 2017-10-07 NOTE — PROGRESS NOTES
Acute systolic congestive heart failure ([facility] [address]     5. Non-rheumatic mitral regurgitation     6. PAH (pulmonary arterial hypertension) with portal hypertension (HCC)     7. Chronic lymphocytic leukemia of B-cell type not having achieved remission ([facility] [address]             Plan:      1. Exam is unremarkable  2. I have independently reviewed radiographic images for this patient as part of this visit. 3. CT shows diffuse GGO. Some areas have a mildly nodular appearance. 4. Echo shows EF 40%, moderate to severe MR and PAH  5. Start him on Lasix 20mg daily  6. Ask Cardiology to weigh in   7. Not sure this explains everyhting  8. Bronch BAL  9. PFT  10. F/U 2 weeks.

## 2017-10-10 NOTE — TELEPHONE ENCOUNTER
From: Prasanna Archuleta  Sent: 10/9/2017 3:38 PM EDT  Subject: Medication Renewal Request    Rosendolve Hinson Ronaldo would like a refill of the following medications:  furosemide (LASIX) 20 MG tablet Ashkan Rebolledo MD]    Preferred pharmacy: [facility] [address], [address] [facility] [phone] - F [phone]    Comment:      Medication renewals requested in this message routed separately:  valsartan-hydrochlorothiazide (DIOVAN HCT) 320-12.5 MG per tablet Sariah An MD]

## 2017-10-20 NOTE — ANESTHESIA PRE-OP
[address] [facility]esthesiology  Pre-Anesthesia Evaluation/Consultation       Name:  Román Mcghee                                         Age:  47 y.o. MRN:    [id number]           Procedure (Scheduled): BRONCHOSCOPY  Surgeon:     Dr. Amado Jean Baptiste MD     No Known Allergies  Patient Active Problem List   Diagnosis    Hyperlipidemia    Essential hypertension    Obesity (BMI 30-39. 9)    Chronic lymphocytic leukemia of B-cell type not having achieved remission (HCC)    SOB (shortness of breath)    Acute systolic congestive heart failure (HCC)    Non-rheumatic mitral regurgitation    PAH (pulmonary arterial hypertension) with portal hypertension (HCC)     Past Medical History:   Diagnosis Date    CLL (chronic lymphocytic leukemia) (HCC)     Hypertension     Lymphoid granulomatosis ([facility] [address]     Onychomycosis      Past Surgical History:   Procedure Laterality Date    EYE SURGERY       Social History   Substance Use Topics    Smoking status: Never Smoker    Smokeless tobacco: Never Used    Alcohol use No       Prior to Admission medications    Medication Sig Start Date End Date Taking?  Authorizing Provider   valsartan-hydrochlorothiazide (DIOVAN HCT) 320-12.5 MG per tablet Take 1 tablet by mouth daily 10/9/17   Sushila Azar MD   furosemide (LASIX) 20 MG tablet Take 1 tablet by mouth daily 10/6/17   Amado Jean Baptiste MD   sodium polystyrene (KAYEXALATE) 15 GM/60ML suspension Take 60 mLs by mouth once for 1 dose 8/22/17 8/22/17  Jazlyn Montez MD       Current Outpatient Prescriptions   Medication Sig Dispense Refill    valsartan-hydrochlorothiazide (DIOVAN HCT) 320-12.5 MG per tablet Take 1 tablet by mouth daily 90 tablet 3    furosemide (LASIX) 20 MG tablet Take 1 tablet by mouth daily 30 tablet 0    sodium polystyrene (KAYEXALATE) 15 GM/60ML suspension Take 60 mLs by mouth once for 1 dose 1 Bottle 3     Current Facility-Administered Medications   Medication Dose Route Frequency Provider BMI  There is no height or weight on file to calculate BMI. Estimated body mass index is 36.3 kg/m² as calculated from the following:    Height as of 10/6/17: 5' 10\" (1.778 m). Weight as of 10/6/17: 253 lb (114.8 kg). Additional Testing (Echo, Stress, ECG, PFTs, etc)    Conclusions      Summary   Four chamber cardiac enlargement.   -Borderline concentric left ventricular hypertrophy is present.   -Global ejection fraction is decreased and estimated 40 %.  -There is mild hypokinesis of the basal inferior wall.   -There are increased E/A velocities consistent with restrictive diastolic   filling (Grade III) .  E/e'= 11.45 .   -Mitral annular calcification is present.   -Moderate to severe mitral regurgitation is present.   -There is moderate tricuspid regurgitation with RVSP estimated at 46 mmHg.   -Dilated left atrium with a volume of 80 ml.   -The right heart is moderately enlarged.   -Right ventricular systolic function appears reduced .      Signature      ------------------------------------------------------------------   Electronically signed by Dianna Leung MD (Interpreting   physician) on 08/22/2017 at 03:44 PM   ------------------------------------------------------------------       Anesthesia Evaluation  Patient summary reviewed and Nursing notes reviewed no history of anesthetic complications:   Airway: Mallampati: II  TM distance: >3 FB   Neck ROM: full  Mouth opening: > = 3 FB Dental:          Pulmonary:   (+) shortness of breath:      (-) pneumonia, COPD, asthma, recent URI and sleep apnea                           Cardiovascular:  Exercise tolerance: good (>4 METS),   (+) hypertension:, CHF:,     (-) pacemaker, valvular problems/murmurs, past MI, CAD, CABG/stent, dysrhythmias,  angina, orthopnea, PND and  GRIFFITH      Rhythm: regular  Rate: normal  Echocardiogram reviewed                  Neuro/Psych:      (-) seizures, neuromuscular disease, TIA, CVA, headaches and psychiatric history           GI/Hepatic/Renal:        (-) hiatal hernia, GERD, PUD, hepatitis, liver disease and bowel prep       Endo/Other:        (-) hypothyroidism, hyperthyroidism, blood dyscrasia, arthritis, no Type II DM, no Type I DM               Abdominal:           Vascular:                                    Anesthesia Plan      MAC     ASA 4       Induction: intravenous. Anesthetic plan and risks discussed with patient. Plan discussed with CRNA. DOS STAFF ADDENDUM:    Pt seen and examined, chart reviewed (including anesthesia, drug and allergy history). No interval changes to history and physical examination. Anesthetic plan, risks, benefits, alternatives, and personnel involved discussed with patient. Patient verbalized an understanding and agrees to proceed.       Juliana Chery MD  October 20, 2017  10:14 AM

## 2017-10-20 NOTE — ANESTHESIA POST-OP
[address] [facility] Anesthesiology  Post-Anesthesia Note       Name:  Rosario Ardon                                         Age:  47 y.o. MRN:  [id number]     Last Vitals & Oxygen Saturation: BP 99/80   Pulse 94   Temp 98 °F (36.7 °C) (Temporal)   Resp 20   SpO2 93%   Patient Vitals for the past 4 hrs:   BP Temp Temp src Pulse Resp SpO2   10/20/17 1150 99/80 - - 94 20 93 %   10/20/17 1130 101/82 - - 101 18 96 %   10/20/17 1125 105/89 - - 101 20 100 %   10/20/17 1120 100/79 98 °F (36.7 °C) Temporal 97 20 100 %   10/20/17 1115 95/80 - - 98 20 100 %   10/20/17 1114 - - - 102 - -   10/20/17 1111 96/64 98.3 °F (36.8 °C) Temporal 100 20 100 %       Level of consciousness:  Awake, alert    Respiratory: Respirations easy, no distress. Stable. Cardiovascular: Hemodynamically stable. Hydration: Adequate. PONV: Adequately managed. Post-op pain: Adequately controlled. Post-op assessment: Tolerated anesthetic well without complication. Complications:  None.     Sunny Carranza MD  October 20, 2017   3:10 PM

## 2017-10-20 NOTE — OP NOTE
Bronchoscopy Procedure Note    Date of Operation: 10/20/17  Pre-op Diagnosis: ILD, Pulmonary nodules  Post-op Diagnosis: Same  Surgeon: Amado Jean Baptiste  Anesthesia: Monitored Local Anesthesia with Sedation  Estimate Blood Loss: Minimal   Complications: None    Indications and History:  The patient is 47 y.o. male with SOB and chagnes of ILD on CT chest. The risks, benefits, complications, treatment options and expected outcomes were discussed with the patient. The possibilities of reaction to medication, pulmonary aspiration, perforation of a viscus, bleeding, failure to diagnose a condition and creating a complication requiring transfusion or operation were discussed with the patient who freely signed the consent. Description of Procedure: The patient was taken to endoscopy suite, identified as Román Mcghee and the procedure verified as flexible fiberoptic bronchoscopy. A time out was held and the above information confirmed. After the induction of topical nasopharyngeal anesthesia, the patient was placed in appropriate position and the bronchoscope was passed through the OP. The vocal cords were visualized and total of 3 ml of 2% Lidocaine was topically placed onto the cords. The cords were normal. The scope was then passed into the trachea. Lidocaine 2% 3 ml was used topically on the kiet. Careful inspection of the tracheal lumen was accomplished. The scope was sequentially passed into the left main and then left upper and lower bronchi and segmental bronchi. The scope was then withdrawn and advanced into the right main bronchus and then into the RUL, RML, and RLL bronchi and segmental bronchi.      Endobronchial findings:   Trachea: Normal mucosa   Kiet: Normal mucosa   Right main bronchus: Normal mucosa   Right upper lobe bronchus: Normal mucosa   Right middle lobe bronchus: Normal mucosa   Right lower lobe bronchus: Normal mucosa   Left main bronchus: Normal mucosa   Left upper lobe bronchus:

## 2017-10-23 PROBLEM — R42 DIZZINESS: Status: ACTIVE | Noted: 2017-01-01

## 2017-10-23 PROBLEM — R60.0 EDEMA OF BOTH LEGS: Status: ACTIVE | Noted: 2017-01-01

## 2017-10-23 PROBLEM — R05.9 COUGH: Status: ACTIVE | Noted: 2017-01-01

## 2017-10-23 NOTE — PROGRESS NOTES
Rikki Evans is a 47 y.o. male patient with:    1. SOB (shortness of breath)    2. Cough    3. Edema of both legs    4. Dizziness        Acute Decompensated Combined Biventricular Systolic and Diastolic Heart Failure (HFrEF 40%, GIIIDD), ACC/AHA Stage C, NYHA Class IV    - with multivalvular HD (moderate MR and TR)    -with mild PAH (will revisit pressures after significant diuresis)    -no previous CHF workup -no concerning social history; there is a family hx. Of CAD and CHF (no SCD)   -Rosendo is very active at baseline- pursuing doctorate in higher education, full time racquetball; we had a long discussion about his recent decline and symptomatology. He understands the diagnosis of CHF and agrees with me to get ahead of it by hospitalizing for formal workup/education and  IV diuresis and medication initiation/titration. I called Dr. Gallito Laws and also spoke with hospitalist to accept him for today. He will hold his Diovan HCT due to SBP 90s here. Eventually, will look to BB as well as lower dose Valsartan. Bronchoscopy with Dr. Sebastian Carlos 10/20/17- path pending     CLL   -Diagnosed in 2014, in remission by last visit     Hypertension controlled on ARB/Thiazide    Dyslipidemia not on Statin therapy       Thank you for allowing me to participate in the care of your patient. Please do not hesitate to call.      Macy Mclean D.O., [facility]  Interventional Cardiology     o: [phone]  [facility] [address], [address] [facility]

## 2017-10-26 PROBLEM — I50.41 ACUTE COMBINED SYSTOLIC AND DIASTOLIC HEART FAILURE (HCC): Status: ACTIVE | Noted: 2017-01-01

## 2017-11-01 NOTE — CARE COORDINATION
[facility] Transitions Initial Follow Up Call    Call within 2 business days of discharge: Yes    Patient: Judie Rust Patient : 1965   MRN: [id number]  Reason for Admission: CHF;pneumonia  Discharge Date: 10/30/17 RARS: [facility] Risk Score: 1.25     Spoke with: rosio [address]: [facility]    Non-face-to-face services provided:  Obtained and reviewed discharge summary and/or continuity of care documents    Inpatient Assessment  Care Transitions 24 Hour Call    Do you have any ongoing symptoms?:  No  Do you have a copy of your discharge instructions?:  Yes  Do you have all of your prescriptions and are they filled?:  Yes  Have you been contacted by a [employer] [address]?:  No  Have you scheduled your follow up appointment?:  Yes  How are you going to get to your appointment?:  Car - family or friend to transport  Were you discharged with any Home Care or Post Acute Services:  No  Do you have support at home?:  Partner/Spouse/SO  Do you feel like you have everything you need to keep you well at home?:  Yes  Are you an active caregiver in your home?:  No  Care Transitions Interventions  No Identified Needs       Pt states doing well, no issues or concerns. Reviewed all new meds. F/u appts below. Will make appt with PCP, politely declined this nurse's assistance. Agreed to more CTC f/u calls      Follow Up  Future Appointments  Date Time Provider Miguel Landrum   2017 10:00 AM Belinda Goltz, CNP FF Cardio [facility]   11/10/2017 8:45 AM STRESS TEST ROOM [facility] STRESS None   2017 11:30 AM Riana Friedman MD PULM & CC [facility]   2017 2:00 PM Marge Vazquez MD FF Cardio [facility]   2017 3:15 PM Dorian Trimble MD [facility]       Farhan Fine RN

## 2017-11-08 NOTE — CARE COORDINATION
[facility] Transitions Follow Up Call    2017    Patient: Felipa Head  Patient : 1965   MRN: [id number]  Reason for Admission: CHF; pneumonia  Discharge Date: 10/30/17 RARS: Risk Score: 1.25       Spoke with: Rosendo Rodriguez Transitions Subsequent and Final Call    Subsequent and Final Calls  Do you have any ongoing symptoms?:  Yes  Onset of Patient-reported symptoms: In the past 7 days  Patient-reported symptoms:  Weight Loss, Other  Interventions for patient-reported symptoms:  Other  Have your medications changed?:  No  Do you have any questions related to your medications?:  No  Do you currently have any active services?:  No  Do you have any needs or concerns that I can assist you with?:  No  Identified Barriers:  None  Care Transitions Interventions  No Identified Needs  Other Interventions:          Pt states doing pretty well, has been having heartburn after taking antibiotic even when he takes with food and he has also had an 8 lb weight loss this week so he is reaching out to MD today. Pt states he has an MD appt on 11/10 as well.  Agreed to more CTC f/u calls      Follow Up  Future Appointments  Date Time Provider Miguel Landrum   11/10/2017 8:45 AM STRESS TEST ROOM [facility] STRESS None   2017 11:30 AM Tomas Garner MD PUL & CC [facility]   2017 2:00 PM Bhavin Talavera MD FF Cardio [facility]   2017 3:15 PM Diaz Arreguin MD [facility]       Rickford Moritz, RN

## 2017-11-09 NOTE — TELEPHONE ENCOUNTER
Phoned patient for update. He was scheduled for follow up 11/6 with CHF but missed appointment. States he thought appointment was 11/10 ( which is stress test) Spoke with Dr. Dion Doe. Patient to have labs in am and will be seen by MD prior to stress. Per Bennett Navarro, patient already fitted at home with [employer].    Patient still taking antibiotics. States primaquine making him nauseated at home. Weight at home is down 8 lb. Taking lasix, dig, coreg. States not taking diovan-hct. Reviewed plan to come prior for labs and to be seen by physician prior. States understanding.

## 2017-11-10 NOTE — PATIENT INSTRUCTIONS
Infectious disease physician - Dr. Lizzie Hung  [address] [place], [place] [address] [address] [phone]  Follow up on pneumonia (seen by Dr. Lilia Watts in hospital)     Call Dr. Zenaida Peters if you gain more than 3 lbs in one day or 5 lbs in one week. Be careful on Thanksgiving because of foods higher in sodium/salt. Symptoms may not develop ~ 2-3 days after salt/sodium consumption. Increase furosemide to 40 mg twice a day until being seen by Dr. Rosa Rivera if top # of blood pressure is less than 100 mmHg. Take Coreg at next scheduled dose if blood pressure is above 100.

## 2017-11-10 NOTE — PROGRESS NOTES
mouth daily for 20 days 10/31/17 11/20/17 Yes Heraclio Dale MD   clindamycin (CLEOCIN) 300 MG capsule Take 2 capsules by mouth every 8 hours for 21 days 10/30/17 11/20/17 Yes Heraclio Dale MD       Allergies:  Review of patient's allergies indicates no known allergies.      Review of Systems:    [x]Full ROS obtained and negative except as mentioned in HPI    Physical Examination:    /82 (Site: Right Arm, Position: Sitting, Cuff Size: Medium Adult)   Pulse 86   Ht 5' 10\" (1.778 m)   Wt 246 lb (111.6 kg)   SpO2 94%   BMI 35.30 kg/m²   Wt Readings from Last 3 Encounters:   11/10/17 246 lb (111.6 kg)   10/30/17 251 lb 15.8 oz (114.3 kg)   10/23/17 258 lb 9.6 oz (117.3 kg)       · GENERAL: Well developed, well nourished, less frequent nonproductive coughing  · NEUROLOGICAL: Alert and oriented x3  · PSYCH: Normal mood and affect   · SKIN: Warm and dry  · HEENT: Normocephalic, atraumatic, Sclera non-icteric, mucous membranes moist  · NECK: supple, JVP +8  · CAROTID: Normal upstroke, no bruits  · CARDIAC: Normal PMI, regular rate and rhythm, normal S1S2, no murmur, rub, or gallop  · RESPIRATORY: CTAB w/o w/r/r  · EXTREMITIES: +1 ankle edema b/l , +2 pulses bilaterally   · MUSCULOSKELETAL: No joint swelling or tenderness, no chest wall tenderness  · GASTROINTESTINAL: normal bowel sounds, soft, non-tender, no bruit    Labs:  Lab Review   Admission on 10/23/2017, Discharged on 10/30/2017   Component Date Value    Sodium 10/24/2017 138     Potassium 10/24/2017 4.4     Chloride 10/24/2017 99     CO2 10/24/2017 26     Anion Gap 10/24/2017 13     Glucose 10/24/2017 100*    BUN 10/24/2017 23*    CREATININE 10/24/2017 1.4*    GFR Non- 10/24/2017 53*    GFR  10/24/2017 >60     Calcium 10/24/2017 8.6     Magnesium 10/24/2017 1.90     TSH 10/24/2017 2.81     Troponin 10/23/2017 0.03*    Troponin 10/23/2017 0.02*    WBC 10/23/2017 136.5*    RBC 10/23/2017 3.83*    Hemoglobin 10/24/2017 1.005     Blood, Urine 10/24/2017 Negative     pH, UA 10/24/2017 6.5     Protein, UA 10/24/2017 Negative     Urobilinogen, Urine 10/24/2017 1.0     Nitrite, Urine 10/24/2017 Negative     Leukocyte Esterase, Urine 10/24/2017 Negative     Microscopic Examination 10/24/2017 Not Indicated     Urine Type 10/24/2017 Not Specified     Sodium, Ur 10/25/2017 100     Creatinine, Ur 10/25/2017 18.7*    Protein, Ur 10/25/2017 5.00     PTH 10/24/2017 128.8*    Phosphorus 10/24/2017 5.3*    Vit D, 25-Hydroxy 10/24/2017 8.6*    Ferritin 10/24/2017 71.7     Iron 10/24/2017 37*    TIBC 10/24/2017 389     Iron Saturation 10/24/2017 10*    Path Consult 10/24/2017 Reviewed     Sodium 10/25/2017 139     Potassium 10/25/2017 3.6     Chloride 10/25/2017 100     CO2 10/25/2017 27     Anion Gap 10/25/2017 12     Glucose 10/25/2017 110*    BUN 10/25/2017 23*    CREATININE 10/25/2017 1.4*    GFR Non- 10/25/2017 53*    GFR  10/25/2017 >60     Calcium 10/25/2017 8.6     Sodium 10/26/2017 136     Potassium 10/26/2017 4.8     Chloride 10/26/2017 97*    CO2 10/26/2017 23     Anion Gap 10/26/2017 16     Glucose 10/26/2017 97     BUN 10/26/2017 27*    CREATININE 10/26/2017 2.1*    GFR Non- 10/26/2017 33*    GFR  10/26/2017 40*    Calcium 10/26/2017 8.8     HIV-1/HIV-2 Ab 10/26/2017 Non-reactive     Sodium, Ur 10/26/2017 34     Creatinine, Ur 10/26/2017 152.4     G-6-PD, Quant 10/28/2017 21.1*    Sodium 10/27/2017 131*    Potassium 10/27/2017 5.6*    Chloride 10/27/2017 94*    CO2 10/27/2017 22     Anion Gap 10/27/2017 15     Glucose 10/27/2017 99     BUN 10/27/2017 31*    CREATININE 10/27/2017 2.3*    GFR Non- 10/27/2017 30*    GFR  10/27/2017 36*    Calcium 10/27/2017 8.6     Pro-BNP 10/27/2017 2109*    Sodium 10/28/2017 133*    Potassium 10/28/2017 4.6     Chloride 10/28/2017 95*    CO2 10/28/2017 23     Anion Gap 10/28/2017 15     Glucose 10/28/2017 97     BUN 10/28/2017 34*    CREATININE 10/28/2017 2.0*    GFR Non- 10/28/2017 35*    GFR  10/28/2017 42*    Calcium 10/28/2017 8.6     Sodium 10/29/2017 134*    Potassium 10/29/2017 3.7     Chloride 10/29/2017 96*    CO2 10/29/2017 24     Anion Gap 10/29/2017 14     Glucose 10/29/2017 97     BUN 10/29/2017 34*    CREATININE 10/29/2017 1.9*    GFR Non- 10/29/2017 37*    GFR  10/29/2017 45*    Calcium 10/29/2017 8.7     WBC 10/29/2017 156.9*    RBC 10/29/2017 3.65*    Hemoglobin 10/29/2017 11.0*    Hematocrit 10/29/2017 34.6*    MCV 10/29/2017 94.9     MCH 10/29/2017 30.0     MCHC 10/29/2017 31.6     RDW 10/29/2017 24.4*    Platelets 32/62/6326 148     MPV 10/29/2017 9.8     IGA 10/30/2017 86.0     Igg 10/30/2017 496.0*    Igm 10/30/2017 20.0*    Sodium 10/30/2017 130*    Potassium 10/30/2017 5.7*    Chloride 10/30/2017 95*    CO2 10/30/2017 24     Anion Gap 10/30/2017 11     Glucose 10/30/2017 100*    BUN 10/30/2017 31*    CREATININE 10/30/2017 1.6*    GFR Non- 10/30/2017 45*    GFR  10/30/2017 55*    Calcium 10/30/2017 8.4     Total Protein 11/01/2017 5.3*    Alb 11/01/2017 2.9*    Alpha-1-Globulin 11/01/2017 0.3     Alpha-2-Globulin 11/01/2017 0.7     Beta Globulin 11/01/2017 0.9     Gamma Globulin 11/01/2017 0.5*    Digoxin Lvl 10/30/2017 <0.3*    Sodium, Ur 10/30/2017 <20     Osmolality, Ur 10/30/2017 401     Potassium 10/30/2017 4.4     SPE/ENRRIQUE Interpretation 11/03/2017 REVIEWED    Hospital Outpatient Visit on 10/20/2017   Component Date Value    Ventricular Rate 10/24/2017 106     Atrial Rate 10/24/2017 105     P-R Interval 10/24/2017 172     QRS Duration 10/24/2017 98     Q-T Interval 10/24/2017 372     QTc Calculation (Bazett) 10/24/2017 494     P Axis 10/24/2017 50     R Axis Gram positive cocci  1+ Gram positive rods  2+ Small Gram negative rods      Organism 10/24/2017 Beta Strep Group C*    CULTURE, RESPIRATORY 10/24/2017                      Value: Moderate growth  Susceptibility testing of penicillin and other beta-lactams is  not necessary for beta hemolytic Streptococci since resistant  strains have not been identified. (CLSI K509-E38, 2017)      Fungus Stain 10/25/2017 No Fungal elements seen     Organism 10/25/2017 Candida albicans*    Fungus (Mycology) Culture 10/25/2017                      Value:Rare growth  No further workup      CULTURE, RESPIRATORY 10/24/2017 1,000-10,000 CFU/mL Normal respiratory nadege*    Gram Stain Result 10/24/2017                      Value:3+ WBC's (Mononuclear)  1+ WBC's (Polymorphonuclear)  No organisms seen      Organism 10/24/2017 Beta Strep Group C*    CULTURE, RESPIRATORY 10/24/2017                      Value:<1,000 CFU/mL  Susceptibility testing of penicillin and other beta-lactams is  not necessary for beta hemolytic Streptococci since resistant  strains have not been identified. (CLSI X984-Y22, 2017)      AFB Culture (Mycobacteri* 11/07/2017                      Value:No growth after 1 week/s of incubation. No growth after 2 week/s of incubation.  AFB Smear 11/07/2017 No AFB observed by Fluorescent stain     Fungus (Mycology) Culture 11/06/2017                      Value:No growth after 1 week/s of incubation. No growth after 2 week/s of incubation.       Fungus Stain 11/06/2017 No Fungal elements seen     Color, Lavage 10/20/2017 Colorless     Appearance BAL (Lavage) 10/20/2017 Hazy     Clot Evaluation BAL 10/20/2017 see below     WBC/EPI Cells Bal 10/20/2017 405     RBC, BAL 10/20/2017 2100     Segmented Neutrophils, B* 10/20/2017 2*    Lymphocytes, BAL 10/20/2017 13*    Monocytes, BAL 10/20/2017 1     Macrophages, BAL 10/20/2017 83*    Mononuclear unidentified* 10/20/2017 1     Number of Cells Counted * 10/20/2017 200     Path Review, BAL (Lavage) 10/20/2017 Yes     Influenza A by PCR 10/23/2017 Not Detected     Influenza A H1 (2009) PCR 10/23/2017 Not Detected     Influenza A H3 PCR 10/23/2017 Not Detected     Influenza A H1 (2009) PCR 10/23/2017 Not Detected     Influenza B by PCR 10/23/2017 Not Detected     RSV A Ab by PCR 10/23/2017 Not Detected     RSV B Ab by PCR 10/23/2017 Not Detected     Parainfluenza 1 10/23/2017 Not Detected     Parainfluenza 2 10/23/2017 Not Detected     Parainfluenza 3 10/23/2017 Not Detected     Human Metapneumovirus PCR 10/23/2017 Not Detected     Rhinovirus RNA XXX PCR 10/23/2017 Not Detected     ADENOVIRUS SPECIES BE 10/23/2017 Not Detected     Adenovirus Species C 10/23/2017 Not Detected     RVP Source 10/23/2017 BAL     VZ source 10/24/2017 BAL     Varicella-Zoster, PCR 10/24/2017 Not Detected     Misc Test Order 10/22/2017 SEE NOTE     Miscellaneous Lab Test R* 10/22/2017 SEE NOTE     Miscellaneous Lab Test R* 11/01/2017 SEE NOTE     CMV Source 10/22/2017 BAL     CMVQ copy/ml 10/22/2017 <390     CMV DNA,Quant PCR 10/22/2017 <2.6     CMV DNA Quant 10/22/2017 <227     CMV DNA,Qnt Interp 10/22/2017 <2.4     CMV DNA,Qnt Interp 10/22/2017 Not Detected     Miscellaneous Lab Test R* 10/22/2017 SEE NOTE     Misc Test Order 10/22/2017 SEE NOTE     Miscellaneous Lab Test R* 10/22/2017 SEE NOTE     Miscellaneous Lab Test R* 10/22/2017 SEE NOTE     Miscellaneous Lab Test R* 11/05/2017 SEE NOTE     Path Consult Fluid 10/24/2017 Reviewed     test code 10/24/2017 PLesley elliott PCR     This Test Sent To 10/24/2017 [facility]     Report 10/24/2017 SEE IMAGE          Imaging:  I have reviewed the below testing personally:    ECHO:  10/24/17  Summary   -Normal left ventricle size. There is concentric left ventricular   hypertrophy. Left ventricular function is reduced with ejection fraction   estimated at 20-25 %.    -Moderate mitral regurgitation is present.   -The left

## 2017-11-14 NOTE — CARE COORDINATION
[facility] Transitions Follow Up Call    2017    Patient: Astrid Tyler  Patient : 1965   MRN: [id number]  Reason for Admission:   Discharge Date: 10/30/17 RARS: Risk Score: 1.25       Spoke with: Dominick Diane 6 Transitions Subsequent and Final Call    Subsequent and Final Calls  Do you have any ongoing symptoms?:  No  Have your medications changed?:  No  Do you have any questions related to your medications?:  No  Do you currently have any active services?:  No  Do you have any needs or concerns that I can assist you with?:  No  Identified Barriers:  None  Care Transitions Interventions  No Identified Needs  Other Interventions:          Pt states doing pretty well, no issues or concerns. MD appts listed below.  No need for further CTC f/u calls      Follow Up  Future Appointments  Date Time Provider Miguel Lucita   2017 11:30 AM Gisella Kenny MD [facility]   2017 2:15 PM Lele White MD  Cardio [facility]   2017 2:00 PM Rosalind Ochoa MD  Cardio [facility]   2017 4:00 PM Esequiel Schaeffer DO  Cardio [facility]   2017 3:15 PM Nafisa Marcum MD [facility]       Margarito Artis, RN

## 2017-11-28 NOTE — PROGRESS NOTES
Take 1 tablet by mouth 2 times daily (with meals)  Lulu Steele MD   digoxin (LANOXIN) 125 MCG tablet Take 1 tablet by mouth daily  Lulu Steele MD   furosemide (LASIX) 20 MG tablet Take 3 tablets by mouth daily  Lulu Steele MD   ferrous sulfate 325 (65 Fe) MG tablet Take 1 tablet by mouth 2 times daily (with meals)  Lulu Steele MD       Vitals:    11/28/17 1141   Weight: 252 lb (114.3 kg)     Body mass index is 36.16 kg/m².      Wt Readings from Last 3 Encounters:   11/28/17 252 lb (114.3 kg)   11/10/17 246 lb (111.6 kg)   10/30/17 251 lb 15.8 oz (114.3 kg)     BP Readings from Last 3 Encounters:   11/10/17 100/82   10/30/17 109/78   10/23/17 92/64        History   Smoking Status    Never Smoker   Smokeless Tobacco    Never Used

## 2017-11-28 NOTE — LETTER
[facility] Infectious Disease  [address] [address] [address] [facility] [address]  Phone: [phone]  Fax: [phone]    Lizzie Hung MD        November 28, 2017     Mikhail Herman, [facility],Fourth Floor [address]    Patient: Thong Hilario  MR Number: [id number]  YOB: 1965  Date of Visit: 11/28/2017    Dear Dr. Mikhail Herman: Thank you for the request for consultation for Rosendo Choi to me for the evaluation of PJP pneumonia. Below are the relevant portions of my assessment and plan of care. If you have questions, please do not hesitate to call me. I look forward to following Rosendo along with you.     Sincerely,        Lizzie Hung MD

## 2017-11-28 NOTE — PROGRESS NOTES
Infectious Diseases Oupatient Follow-up Note      Reason for Visit:               PJP pneumonia  Primary Care Physician:  Oswaldo Bai MD  History Obtained From:   Patient , Medical Records     CHIEF COMPLAINT:    Chief Complaint   Patient presents with    Follow-Up from Hospital     B/l PJP pneumonia, clindamycin making him sick       INTERVAL HISTORY: Minnie Bence is a 47 y.o. male patient, who was seen today in ID clinic for follow-up. History was obtained from chart review and the patient. The patient was seen for PJP pneumonia. He has h/o CLL and had a CTA chest done on 8/22/17, which showed b/l reticulonodular shadows and diffuse ground glassing. Had a bronchoscopy done on 10/20/17 and PJP PCR was positive. His G6{D screen was negative. He was started on Clindamcyin and Primaquine on 10/29/17 and plan was to treat him for 3 weeks. He thinks that Clindamycin has been making him nauseated, so he is not very compliant with that. Past Medical History:   Past medical and surgical history was reviewed by me during this visit in detail. Past Medical History:   Diagnosis Date    [facility] [address]     Hypertension     Lymphoid granulomatosis [address] [address]     Onychomycosis        Past Surgical History:    Past Surgical History:   Procedure Laterality Date    BRONCHOSCOPY  10/20/2017    with BAL    EYE SURGERY         Current Medications:    All medications were reviewed by me during this visit  Current Outpatient Prescriptions   Medication Sig Dispense Refill    atovaquone (MEPRON) 750 MG/5ML suspension Take 5 mLs by mouth every 12 hours for 21 days 210 mL 0    ondansetron (ZOFRAN) 4 MG tablet Take 1 tablet by mouth every 6 hours as needed for Nausea 20 tablet 0    potassium chloride (KLOR-CON M) 10 MEQ extended release tablet Take 1 tablet by mouth 2 times daily 60 tablet 3    carvedilol (COREG) 3.125 MG tablet Take 1 tablet by mouth 2 times daily (with meals) 60 tablet 3    digoxin (LANOXIN) 125 MCG tablet Take 1 tablet by mouth daily 30 tablet 3    furosemide (LASIX) 20 MG tablet Take 3 tablets by mouth daily 60 tablet 3    ferrous sulfate 325 (65 Fe) MG tablet Take 1 tablet by mouth 2 times daily (with meals) 30 tablet 3     No current facility-administered medications for this visit. Family history: All family history was reviewed by me today    Family History   Problem Relation Age of Onset    Diabetes Mother     Hypertension Mother     Heart Disease Mother     Stroke Maternal Grandfather     Heart Disease Maternal Grandfather      CAD    Heart Disease Brother     No Known Problems Father        No family history of immunocompromising or autoimmune conditions. No h/o TB in in family or contacts    Social History:  All social and epidemiologic history was reviewed and updated be me in detail today. Social History     Social History    Marital status:      Spouse name: N/A    Number of children: N/A    Years of education: N/A     Social History Main Topics    Smoking status: Never Smoker    Smokeless tobacco: Never Used    Alcohol use No    Drug use: Unknown    Sexual activity: Not on file     Other Topics Concern    Not on file     Social History Narrative    No narrative on file       Immunizations: All immunizations were reviewed by me in detail. Immunization History   Administered Date(s) Administered    Tdap (Boostrix, Adacel) 01/07/2013         REVIEW OF SYSTEMS:      Review of Systems   Constitutional: Negative for chills, diaphoresis, fever and malaise/fatigue. HENT: Negative for ear discharge, ear pain and sore throat. Eyes: Negative for blurred vision, double vision, discharge and redness. Respiratory: Positive for cough and shortness of breath. Negative for hemoptysis, sputum production and wheezing. Cardiovascular: Negative for chest pain and leg swelling.    Gastrointestinal: Positive for nausea (With stop antibiotics and call our office if these problems were to occur, or go to nearest ER if experiencing severe symptoms. 3. Discussed patient's condition and what to expect. All of the patient's questions were addressed in a satisfactory manner and patient verbalized understanding all instructions. Weight loss counseling:    Extensive weight loss counseling was done. It is important to set a realistic weight loss goal. First goal should be to avoid gaining more weight and staying at current weight (or within 5 percent). People at high risk of developing diabetes who are able to lose 5 percent of their body weight and maintain this weight will reduce their risk of developing diabetes by about 50 percent and reduce their blood pressure. Losing more than 15 percent of  body weight and staying at this weight is an extremely good result, even if you never reach your \"dream\" or \"ideal\" weight. Lifestyle changes including changing eating habits, substituting excess carbohydrates with proteins, stress reduction, using self-help programs like Weight Watchers®, Overeaters Anonymous®, and Take Off Pounds Sensibly (TOPS)© , following DASH diet and increasing exercise or walking briskly daily for half hour to and hour 5-7 days a week was suggested among other measures. Information was given about various weight loss education programs and their websites like www.cdc.gov/healthyweight, www.choosemyplate.gov and www.health.gov/dietaryguidelines/      Follow-up:    1. Follow-up with me in ID clinic in 3 months      TIME SPENT TODAY:     - Spent over 41 minutes on visit (including interval history, physical exam, review of data including labs, cultures, imaging, development and implementation of treatment plan and coordination of care). - Over 50% of time spent with pt counseling and education. Thankyou for involving me in the care of your patient.   If you have any additional questions, please do not hesitate to

## 2017-12-05 PROBLEM — I50.22 SYSTOLIC CHF, CHRONIC (HCC): Status: ACTIVE | Noted: 2017-01-01

## 2017-12-05 PROBLEM — I50.21 ACUTE SYSTOLIC CONGESTIVE HEART FAILURE (HCC): Status: RESOLVED | Noted: 2017-01-01 | Resolved: 2017-01-01

## 2017-12-05 NOTE — PROGRESS NOTES
facility-administered medications for this visit. Past Medical History:   Diagnosis Date    [facility]ocytic [facility]) ([facility] [address]     Hypertension     Lymphoid granulomatosis ([facility] [address]     Onychomycosis      Past Surgical History:   Procedure Laterality Date    BRONCHOSCOPY  10/20/2017    with BAL    EYE SURGERY       Family History   Problem Relation Age of Onset    Diabetes Mother     Hypertension Mother     Heart Disease Mother     Stroke Maternal Grandfather     Heart Disease Maternal Grandfather      CAD    Heart Disease Brother     No Known Problems Father      Social History     Social History    Marital status:      Spouse name: N/A    Number of children: N/A    Years of education: N/A     Occupational History    Not on file. Social History Main Topics    Smoking status: Never Smoker    Smokeless tobacco: Never Used    Alcohol use No    Drug use: No    Sexual activity: Yes     Partners: Female     Other Topics Concern    Not on file     Social History Narrative    No narrative on file       Review of Systems:   · Constitutional: there has been no unanticipated weight loss. There's been no change in energy level, sleep pattern, or activity level. · Eyes: No visual changes or diplopia. No scleral icterus. · ENT: No Headaches, hearing loss or vertigo. No mouth sores or sore throat. · Cardiovascular: Reviewed in HPI  · Respiratory: No cough or wheezing, no sputum production. No hematemesis. · Gastrointestinal: No abdominal pain, appetite loss, blood in stools. No change in bowel or bladder habits. · Genitourinary: No dysuria, trouble voiding, or hematuria. · Musculoskeletal:  No gait disturbance, weakness or joint complaints. · Integumentary: No rash or pruritis. · Neurological: No headache, diplopia, change in muscle strength, numbness or tingling. No change in gait, balance, coordination, mood, affect, memory, mentation, behavior.   · Psychiatric: No anxiety, no depression. · Endocrine: No malaise, fatigue or temperature intolerance. No excessive thirst, fluid intake, or urination. No tremor. · Hematologic/Lymphatic: No abnormal bruising or bleeding, blood clots or swollen lymph nodes. · Allergic/Immunologic: No nasal congestion or hives. Physical Examination:    Vitals:    12/05/17 1445   BP: 100/62   Site: Right Arm   Position: Sitting   Cuff Size: Large Adult   Pulse: 78   Resp: 16   SpO2: 98%   Weight: 243 lb 12.8 oz (110.6 kg)   Height: 5' 10\" (1.778 m)     Body mass index is 34.98 kg/m². Wt Readings from Last 3 Encounters:   12/05/17 243 lb 12.8 oz (110.6 kg)   11/28/17 252 lb (114.3 kg)   11/28/17 252 lb (114.3 kg)     BP Readings from Last 3 Encounters:   12/05/17 100/62   11/28/17 98/68   11/28/17 133/71     Constitutional and General Appearance:   WD/WN in NAD  HEENT:  NC/AT  JUN  No problems with hearing  Skin:  Warm, dry  Respiratory:  · Normal excursion and expansion without use of accessory muscles  · Resp Auscultation: Normal breath sounds without dullness  Cardiovascular:  · The apical impulses not displaced  · Heart tones are crisp and normal  · Cervical veins are not engorged  · The carotid upstroke is normal in amplitude and contour without delay or bruit  · JVP less than 8 cm H2O  RRR with nl S1 and S2 without m,r,g  · Peripheral pulses are symmetrical and full  · There is no clubbing, cyanosis of the extremities. · No edema  · Femoral Arteries: 2+ and equal  · Pedal Pulses: 2+ and equal   Neck:  · No thyromegaly  Abdomen:  · No masses or tenderness  · Liver/Spleen: No Abnormalities Noted  Neurological/Psychiatric:  · Alert and oriented in all spheres  · Moves all extremities well  · Exhibits normal gait balance and coordination  · No abnormalities of mood, affect, memory, mentation, or behavior are noted      Assessment:    1. Systolic CHF, chronic (HCC)    2. Stage 2 chronic kidney disease    3. Essential hypertension    4.  SOB (shortness of breath)         Plan:  Stop potassium. Continue all other meds  Start spironolactone 25 mg daily. Would like to possibly increase coreg or add low dose ACEI next visit  Standing order for labs today in 2 weeks. Follow up in 12/15/17 @ 4:15. QUALITY MEASURES  1. Tobacco Cessation Counseling: NA  2. Retake of BP if >140/90:   na  3. Documentation to PCP/referring for new patient:  Sent to PCP at close of office visit  4. CAD patient on anti-platelet: na  5. CAD patient on STATIN therapy:    6. Patient with CHF and aFib on anticoagulation: na      I appreciate the opportunity of cooperating in the care of this patient.     Karri Coy M.D., [facility]

## 2017-12-15 PROBLEM — I34.0 NON-RHEUMATIC MITRAL REGURGITATION: Chronic | Status: ACTIVE | Noted: 2017-01-01

## 2017-12-15 PROBLEM — I50.22 SYSTOLIC CHF, CHRONIC (HCC): Chronic | Status: ACTIVE | Noted: 2017-01-01

## 2017-12-15 PROBLEM — K76.6 PAH (PULMONARY ARTERIAL HYPERTENSION) WITH PORTAL HYPERTENSION (HCC): Chronic | Status: ACTIVE | Noted: 2017-01-01

## 2017-12-15 PROBLEM — R06.02 SOB (SHORTNESS OF BREATH): Chronic | Status: ACTIVE | Noted: 2017-01-01

## 2017-12-15 PROBLEM — I27.21 PAH (PULMONARY ARTERIAL HYPERTENSION) WITH PORTAL HYPERTENSION (HCC): Chronic | Status: ACTIVE | Noted: 2017-01-01

## 2017-12-15 NOTE — PROGRESS NOTES
[facility]  Advanced CHF/Pulmonary Hypertension   Cardiac Follow up       Bev Stephens  YOB: 1965    Date of Visit:  12/15/17      Chief Complaint   Patient presents with    Congestive Heart Failure     not taking spironolactone--it was not ordered    Shortness of Breath       History of Present Illness:  Bev Stephens is a 47 y.o. male who presents from referral from Dr. Siomara Edouard for consultation and management of CHF. 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 He has rare soboe at times no aggravating or associated factors. He declined wearing his life vest because it gets in the way of him doing his job and sent it back to the company. He   LVEF 20-25% on echo 10/17. He is not on an ACEI due to renal dysfunction that he had in the hospital after bactrim. Over the past week or so his weight has been trending down on his home scale. He states that it's down from ~146 to 136 pounds. He is monitoring his sodium and has lost weight that way. He continues to take all medications as ordered except his spironolactone that was not at his pharmacy when he went to pick it up. We discussed the the spironolactone is for keeping his swelling down, which is down today. He feels his BP is lower than we have in office and he feels generally not good. He has vague symptoms of weakness and feelig wiped out. Still has a cough. He is able to lay down without sob. He does not wake up feeling smothery. He denies chest pain, shortness of breath, palpitations or syncope.       No Known Allergies  Current Outpatient Prescriptions   Medication Sig Dispense Refill    furosemide (LASIX) 40 MG tablet Take 1 tablet by mouth 2 times daily 180 tablet 3    atovaquone (MEPRON) 750 MG/5ML suspension Take 5 mLs by mouth every 12 hours for 21 days 210 mL 0    ondansetron (ZOFRAN) 4 MG tablet Take 1 tablet by mouth every 6 hours as needed for loss, blood in stools. No change in bowel or bladder habits. · Genitourinary: No dysuria, trouble voiding, or hematuria. · Musculoskeletal:  No gait disturbance, weakness or joint complaints. · Integumentary: No rash or pruritis. · Neurological: No headache, diplopia, change in muscle strength, numbness or tingling. No change in gait, balance, coordination, mood, affect, memory, mentation, behavior. · Psychiatric: No anxiety, no depression. · Endocrine: No malaise, fatigue or temperature intolerance. No excessive thirst, fluid intake, or urination. No tremor. · Hematologic/Lymphatic: No abnormal bruising or bleeding, blood clots or swollen lymph nodes. · Allergic/Immunologic: No nasal congestion or hives. Physical Examination:      Wt Readings from Last 3 Encounters:   12/05/17 243 lb 12.8 oz (110.6 kg)   11/28/17 252 lb (114.3 kg)   11/28/17 252 lb (114.3 kg)     BP Readings from Last 3 Encounters:   12/05/17 100/62   11/28/17 98/68   11/28/17 133/71     Constitutional and General Appearance:   WD/WN in NAD  HEENT:  NC/AT  JUN  No problems with hearing  Skin:  Warm, dry  Respiratory:  · Normal excursion and expansion without use of accessory muscles  · Resp Auscultation: Normal breath sounds without dullness  Cardiovascular:  · The apical impulses not displaced  · Heart tones are crisp and normal  · Cervical veins are not engorged  · The carotid upstroke is normal in amplitude and contour without delay or bruit  · JVP less than 8 cm H2O  RRR with nl S1 and S2 without m,r,g  · Peripheral pulses are symmetrical and full  · There is no clubbing, cyanosis of the extremities.   · No edema  · Femoral Arteries: 2+ and equal  · Pedal Pulses: 2+ and equal   Neck:  · No thyromegaly  Abdomen:  · No masses or tenderness  · Liver/Spleen: No Abnormalities Noted  Neurological/Psychiatric:  · Alert and oriented in all spheres  · Moves all extremities well  · Exhibits normal gait balance and coordination  · No abnormalities of mood, affect, memory, mentation, or behavior are noted      Assessment:    1. Systolic CHF, chronic ([facility]     2. Essential hypertension     3. SOB (shortness of breath)     4. Cardiomyopathy, unspecified type ([facility] [address]     5. PAH (pulmonary arterial hypertension) with portal hypertension (HCC)       Plan:  Continue all meds  Start spironolactone 25 mg daily. Decrease lasix to 1 in the mornings and 1/2 tablet in the afternoon. Standing order for labs next week and the following one. RTO 11/27/2017     QUALITY MEASURES  1. Tobacco Cessation Counseling: NA  2. Retake of BP if >140/90:   na  3. Documentation to PCP/referring for new patient:  Sent to PCP at close of office visit  4. CAD patient on anti-platelet: na  5. CAD patient on STATIN therapy:    6. Patient with CHF and aFib on anticoagulation: na      I appreciate the opportunity of cooperating in the care of this patient.     Viet Joseph M.D., [facility]

## 2017-12-21 NOTE — PROGRESS NOTES
PATIENT: Wing Dixon  : 1965    Primary Care Provider:   Jonathan Burger MD  O:[phone]  N:[phone]      Reason for evaluation:   Chief Complaint   Patient presents with    Cardiomyopathy    Congestive Heart Failure    Hypertension    Hyperlipidemia       History of present illness:   Mr. Wing Dixon is a 47 y.o. male patient here for cardiovascular follow up. He and his wife Blanca Steiner detail his recent history. Completed course of Atovaquone after not tolerating Clindamycin- with ID follow up in January. Persistent cough - nonproductive, no fevers, chills, nausea but endorses occasional posttussive emesis. 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 Denies PND orthopnea- but acknowledges increased abdominal distension and LE edema. Says that he manages his own Lasix- \"always 40 mg once a day and some times based on estimate a second pill\". He feels that he has gained weight intentionally with a desire \"to feel better with a little more meat\". Medical History:      Diagnosis Date    [facility]LL [facility]chronic [facility]ocytic leukemia) ([facility] [address]     Hypertension     Lymphoid granulomatosis ([facility] [address]     Onychomycosis        Surgical History:      Procedure Laterality Date    BRONCHOSCOPY  10/20/2017    with BAL    EYE SURGERY         Social History:  Social History     Social History    Marital status:      Spouse name: N/A    Number of children: N/A    Years of education: N/A     Occupational History    Not on file. Social History Main Topics    Smoking status: Never Smoker    Smokeless tobacco: Never Used    Alcohol use No    Drug use: No    Sexual activity: Yes     Partners: Female     Other Topics Concern    Not on file     Social History Narrative    No narrative on file        Family History:  No evidence for sudden cardiac death or premature CAD.       Problem Relation Age of Onset    Diabetes Mother 10/23/2017 1.0     Eosinophils % 10/23/2017 0.0     Basophils % 10/23/2017 0.0     Neutrophils # 10/23/2017 5.5     Lymphocytes # 10/23/2017 129.7*    Monocytes # 10/23/2017 1.4*    Eosinophils # 10/23/2017 0.0     Basophils # 10/23/2017 0.0     Atypical Lymphocytes Rel* 10/23/2017 84*    Smudge Cells 10/23/2017 Present*    Anisocytosis 10/23/2017 2+*    Macrocytes 10/23/2017 1+*    Microcytes 10/23/2017 Occasional*    Polychromasia 10/23/2017 1+*    Poikilocytes 10/23/2017 1+*    Schistocytes 10/23/2017 Occasional*    Cholesterol, Total 10/24/2017 142     Triglycerides 10/24/2017 125     HDL 10/24/2017 15*    LDL Calculated 10/24/2017 102*    VLDL CHOLESTEROL CALCULA* 10/24/2017 25     Sodium 10/23/2017 139     Potassium 10/23/2017 4.7     Chloride 10/23/2017 102     CO2 10/23/2017 19*    Anion Gap 10/23/2017 18*    Glucose 10/23/2017 106*    BUN 10/23/2017 25*    CREATININE 10/23/2017 1.4*    GFR Non- 10/23/2017 53*    GFR  10/23/2017 >60     Calcium 10/23/2017 9.0     Total Protein 10/23/2017 6.1*    Alb 10/23/2017 3.5     Albumin/Globulin Ratio 10/23/2017 1.3     Total Bilirubin 10/23/2017 1.4*    Alkaline Phosphatase 10/23/2017 32*    ALT 10/23/2017 33     AST 10/23/2017 35     Globulin 10/23/2017 2.6     Pro-BNP 10/23/2017 3730*    Color, UA 10/24/2017 YELLOW     Clarity, UA 10/24/2017 Clear     Glucose, Ur 10/24/2017 Negative     Bilirubin Urine 10/24/2017 Negative     Ketones, Urine 10/24/2017 Negative     Specific Gravity, UA 10/24/2017 1.005     Blood, Urine 10/24/2017 Negative     pH, UA 10/24/2017 6.5     Protein, UA 10/24/2017 Negative     Urobilinogen, Urine 10/24/2017 1.0     Nitrite, Urine 10/24/2017 Negative     Leukocyte Esterase, Urine 10/24/2017 Negative     Microscopic Examination 10/24/2017 Not Indicated     Urine Type 10/24/2017 Not Specified     Sodium, Ur 10/25/2017 100     Creatinine, Ur 10/25/2017 18.7* 10/29/2017 34*    CREATININE 10/29/2017 1.9*    GFR Non- 10/29/2017 37*    GFR  10/29/2017 45*    Calcium 10/29/2017 8.7     WBC 10/29/2017 156.9*    RBC 10/29/2017 3.65*    Hemoglobin 10/29/2017 11.0*    Hematocrit 10/29/2017 34.6*    MCV 10/29/2017 94.9     MCH 10/29/2017 30.0     MCHC 10/29/2017 31.6     RDW 10/29/2017 24.4*    Platelets 89/51/0134 148     MPV 10/29/2017 9.8     IGA 10/30/2017 86.0     Igg 10/30/2017 496.0*    Igm 10/30/2017 20.0*    Sodium 10/30/2017 130*    Potassium 10/30/2017 5.7*    Chloride 10/30/2017 95*    CO2 10/30/2017 24     Anion Gap 10/30/2017 11     Glucose 10/30/2017 100*    BUN 10/30/2017 31*    CREATININE 10/30/2017 1.6*    GFR Non- 10/30/2017 45*    GFR  10/30/2017 55*    Calcium 10/30/2017 8.4     Total Protein 11/01/2017 5.3*    Alb 11/01/2017 2.9*    Alpha-1-Globulin 11/01/2017 0.3     Alpha-2-Globulin 11/01/2017 0.7     Beta Globulin 11/01/2017 0.9     Gamma Globulin 11/01/2017 0.5*    Digoxin Lvl 10/30/2017 <0.3*    Sodium, Ur 10/30/2017 <20     Osmolality, Ur 10/30/2017 401     Potassium 10/30/2017 4.4     SPE/ENRRIQUE Interpretation 11/03/2017 REVIEWED          Imaging:  I have reviewed the below testing personally:    ECHO:  10/24/17  Summary   -Normal left ventricle size. There is concentric left ventricular   hypertrophy. Left ventricular function is reduced with ejection fraction   estimated at 20-25 %.  -Moderate mitral regurgitation is present.   -The left atrium is dilated.   -Trivial aortic regurgitation is present.   -The right atrium is dilated.   -There is moderate to severe tricuspid regurgitation with RVSP estimated at   39 mmHg.     11/10/17  Summary   Left ventricular enlargement.   Normal myocardial perfusion.  Minimal fixed diaphragmatic attenuation.   There is severe global LV systolic dysfunction with ejection fraction of 26   %.   Overall findings represent a high risk scan based on severe LV dysfunction.     12/12/17 CT CHest with Contrast   Stable cardiomegaly with trace right pleural effusion.       Patchy basilar atelectasis.       Hepatosplenomegaly with new ascites since the prior study. Impression/Recommendations    Mr. Rosendo Rodriges is a 47 y. o. male patient of Dr. 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 -Spent greater than 60 minutes with Rosendo and his wife Shira and coordinating care- speaking directly with Tevin Branch and Yonny. With hepatomegaly/new ascites, decision was made to attempt more aggressive, consistent diuresis as outpatient first with low threshold for admission. His liver function is currently normal. Although it could all be congestive, have ordered a Liver US.   Called Rosendo after his bloodwork resulted the evening of the appointment to relay the following:                           -Lasix 40 mg to bid every day with Potassium 10 meQ bid     -Spironolactone 25 mg daily consistently     -Coreg 3.125 mg bid    -Continue Digoxin (level appropriate)    -Still waiting to start ACEI or ARB until monitor renal function after this increased diuretic regimen     -Appreciate assistance of CHF Specialist Dr. Avni Kelly - next 12/27 F/U     - At this time repeat TTE would be January 24, 2018 if not admitted sooner                             CKD Stable                    PJP PNA (HIV negative) by October 2017 Bronchoscopy - appreciate ID and Pulm , just completed 21 day course of Atovaquone, F/U in January to discuss potential for long term abx    CLL (WBC 129K on 12/21)  with Oncology surveillance Dr. Arsenio Ross- asked to schedule an appointment for further input   Hypertension controlled on BB  Dyslipidemia not on Statin therapy at this time      Thank you for allowing me to participate in the care of your patient. Please do not hesitate to call.      Tal Davey D.O., [facility]  Interventional Cardiology     o: [phone]  [address] [place] [address]., [address] [facility], [address] [place] [address]

## 2017-12-22 PROBLEM — R06.02 SOB (SHORTNESS OF BREATH): Chronic | Status: RESOLVED | Noted: 2017-01-01 | Resolved: 2017-01-01

## 2017-12-22 PROBLEM — R05.9 COUGH: Status: RESOLVED | Noted: 2017-01-01 | Resolved: 2017-01-01

## 2017-12-22 NOTE — PROGRESS NOTES
Subjective:      Patient ID: Kelsi Bueno is a 47 y.o. male. HPI   Patient comes the office today for follow-up of his chronic lymphocytic leukemia, recent pneumonia related to pneumocystis and a fungal pneumonia. Etiology of all of these causes has not been clear. It is not believed that his symptoms are related to the leukemia. Patient has a white blood cell count that has been in the low 100,000 range for the last several months. He is not currently undergoing treatment for management of his leukemia either. The patient has an ejection fraction of about 20-25% and the etiology of this is not clear. Stress test evaluation of the heart shows a global dysfunction, not associated with a local wall motion abnormality. Patient does not have fever. He has occasional cough related to his symptoms. Recently, the patient was noted to have some jaundice and on laboratory evaluation, the patient is noted to have some elevation in his bilirubin. Review of Systems    Objective:   Physical Exam   Constitutional: He is oriented to person, place, and time. He appears well-developed and well-nourished. No distress. HENT:   Head: Normocephalic and atraumatic. Cardiovascular: Normal rate, regular rhythm, normal heart sounds and intact distal pulses. Exam reveals no gallop and no friction rub. No murmur heard. Pulmonary/Chest: Effort normal and breath sounds normal. No respiratory distress. He has no wheezes. He has no rales. He exhibits no tenderness. Abdominal: Soft. He exhibits distension. He exhibits no mass. There is no rebound and no guarding. Patient has a positive fluid wave on palpation of the abdomen. There is no evidence that he has hepatomegaly however. Percussion of the liver it seems to be normal.  Patient does have a protruding umbilicus consistent with ascites. Neurological: He is alert and oriented to person, place, and time. No cranial nerve deficit. Skin: Skin is warm and dry.  He is not diaphoretic. Psychiatric: He has a normal mood and affect. His behavior is normal. Judgment and thought content normal.   Vitals reviewed. Assessment/Plan:  Rosendo was seen today for hypertension. 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 Caren Chen who does not at this time believe that the patient is in blast crisis given that there is no evidence that he has increased cellular division. Further, his white blood cell count is stable. I also spoke with the cardiologist, and Dr. Gris Jorgensen in the etiology of the patient's shortness of breath is congestive heart failure but the reason for the heart failure is not clear. The above labs were ordered to determine if there is increased cellular division consistent with a blast crisis. Patient will also follow-up with the pulmonary specialist on the pneumocystis infection. It should be noted that the patient's prognosis is poor given that he has multiple medical problems without a clear-cut single cause.     Dawson Benítez MD

## 2017-12-27 NOTE — LETTER
[address] [address] [address] [address]  Phone: [phone]  Fax: [phone]    José Luis Hill MD        January 2, 2018     Benny Kim, [address] [facility],Fourth Floor [address]    Patient: Patrizia Pérez  MR Number: [id number]  YOB: 1965  Date of Visit: 12/27/2017    Dear Dr. Benny Kim:        Sandra Coronado CHF/Pulmonary Hypertension   Cardiac Follow up       Patrizia Pérez  YOB: 1965    Date of Visit:  12/27/17      Chief Complaint   Patient presents with    Congestive Heart Failure    Shortness of Breath     with any activity       History of Present Illness:  Patrizia Pérez is a 47 y.o. male who presents in follow up for management of CHF. 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 He declined wearing his life vest because it gets in the way of him doing his job and sent it back to the company. He LVEF 20-25% on echo 10/17. He is not on an ACEI due to renal dysfunction that he had in the hospital after bactrim. Today, he states he does feel like he has some labored breathing with walking. He is now taking Lasix 40 mg twice a day. Currently also taking digoxin, coreg, and spironolactone. His weight on his home scale has been fairly stable according to him; he has not lost any weight in the past week. He thinks he is still holding about 5 lbs of extra fluid. Although he does feel he may have gained a couple pounds with eating over the holidays. He was watching his sodium closely making sure it was less than 2 gm/day. He has slacked off a bit but plans to watch it more closely again. He denies chest pain, dizziness, or syncope. Has BLE edema today, right greater than left. He continues to have coughing with dry heaves.  He will be following up with Dr Matty Benitez on Tuesday of next week for his follow up of pneumocystis. His wife is here with him for visit today. Allergies   Allergen Reactions    Phosphorus      Current Outpatient Prescriptions   Medication Sig Dispense Refill    potassium chloride (KLOR-CON M) 10 MEQ extended release tablet Take 1 tablet by mouth 2 times daily 60 tablet 3    spironolactone (ALDACTONE) 25 MG tablet Take 1 tablet by mouth daily 30 tablet 11    furosemide (LASIX) 40 MG tablet Take 1 tablet by mouth 2 times daily 180 tablet 3    carvedilol (COREG) 3.125 MG tablet Take 1 tablet by mouth 2 times daily (with meals) 60 tablet 3    digoxin (LANOXIN) 125 MCG tablet Take 1 tablet by mouth daily 30 tablet 3     No current facility-administered medications for this visit. Past Medical History:   Diagnosis Date    [facility]     Essential hypertension 11/2/2011    Hypertension     Lymphoid granulomatosis ([facility]     Onychomycosis     Stage 2 chronic kidney disease      Past Surgical History:   Procedure Laterality Date    BRONCHOSCOPY  10/20/2017    with BAL    EYE SURGERY       Family History   Problem Relation Age of Onset    Diabetes Mother     Hypertension Mother     Heart Disease Mother     Stroke Maternal Grandfather     Heart Disease Maternal Grandfather      CAD    Heart Disease Brother     No Known Problems Father      Social History     Social History    Marital status:      Spouse name: N/A    Number of children: N/A    Years of education: N/A     Occupational History    Not on file. Social History Main Topics    Smoking status: Never Smoker    Smokeless tobacco: Never Used    Alcohol use No    Drug use: No    Sexual activity: Yes     Partners: Female     Other Topics Concern    Not on file     Social History Narrative    No narrative on file       Review of Systems:   · Constitutional: there has been no unanticipated weight loss.  There's been no change in energy level, sleep pattern, or activity level. · Eyes: No visual changes or diplopia. No scleral icterus. · ENT: No Headaches, hearing loss or vertigo. No mouth sores or sore throat. · Cardiovascular: Reviewed in HPI  · Respiratory: No wheezing, no sputum production. No hematemesis. +Coughing  · Gastrointestinal: No abdominal pain, appetite loss, blood in stools. No change in bowel or bladder habits. · Genitourinary: No dysuria, trouble voiding, or hematuria. · Musculoskeletal:  No gait disturbance, weakness or joint complaints. · Integumentary: No rash or pruritis. · Neurological: No headache, diplopia, change in muscle strength, numbness or tingling. No change in gait, balance, coordination, mood, affect, memory, mentation, behavior. · Psychiatric: No anxiety, no depression. · Endocrine: No malaise, fatigue or temperature intolerance. No excessive thirst, fluid intake, or urination. No tremor. · Hematologic/Lymphatic: No abnormal bruising or bleeding, blood clots or swollen lymph nodes. · Allergic/Immunologic: No nasal congestion or hives.     Physical Examination:      Wt Readings from Last 3 Encounters:   12/27/17 250 lb (113.4 kg)   12/22/17 245 lb 12.8 oz (111.5 kg)   12/21/17 245 lb 1.9 oz (111.2 kg)     BP Readings from Last 3 Encounters:   12/27/17 112/78   12/22/17 98/66   12/21/17 108/60     Constitutional and General Appearance:   WD/WN in NAD  HEENT:  NC/AT  JUN  No problems with hearing  Skin:  Warm, dry  Respiratory:  · Normal excursion and expansion without use of accessory muscles  · Resp Auscultation: Normal breath sounds without dullness  Cardiovascular:  · The apical impulses not displaced  · Heart tones are crisp and normal  · Cervical veins are not engorged  · The carotid upstroke is normal in amplitude and contour without delay or bruit  · JVP less than 8 cm H2O  RRR with nl S1 and S2 without m,r,g  · Peripheral pulses are symmetrical and full · There is no clubbing, cyanosis of the extremities. · BLE edema  · Femoral Arteries: 2+ and equal  · Pedal Pulses: 2+ and equal   Neck:  · No thyromegaly  Abdomen: increased abdominal girth consistent with ascites  · No masses or tenderness  · Liver/Spleen: No Abnormalities Noted  Neurological/Psychiatric:  · Alert and oriented in all spheres  · Moves all extremities well  · Exhibits normal gait balance and coordination  · No abnormalities of mood, affect, memory, mentation, or behavior are noted    Echo 8/22/17    Summary   Four chamber cardiac enlargement.   -Borderline concentric left ventricular hypertrophy is present.   -Global ejection fraction is decreased and estimated 40 %.  -There is mild hypokinesis of the basal inferior wall.   -There are increased E/A velocities consistent with restrictive diastolic   filling (Grade III) . E/e'= 11.45 .   -Mitral annular calcification is present.   -Moderate to severe mitral regurgitation is present.   -There is moderate tricuspid regurgitation with RVSP estimated at 46 mmHg.   -Dilated left atrium with a volume of 80 ml.   -The right heart is moderately enlarged.   -Right ventricular systolic function appears reduced . Assessment:    1. Systolic CHF, chronic (HCC) - Needs diuresis. Start Valsartan 40 mg and increase Spironolactone to 50 mg. Recheck labs today and Tuesday    2. Essential hypertension - /78   Pulse 56   Ht 5' 10\" (1.778 m)   Wt 250 lb (113.4 kg)   BMI 35.87 kg/m²  stable    3. SOB (shortness of breath) - Labored with walking. Needs more diuresis. 4. Cardiomyopathy, unspecified type ([address] - Echo 8/22/17 EF 40%    5.  PAH (pulmonary arterial hypertension) with portal hypertension ([address] - Echo 8/22/17 showed RVSP 46 mmHg        Plan:  Start Valsartan 40 mg at bedtime (1/2 of 80 mg tab)  Increase Spironolactone to 50 mg daily   Continue other medications  If weight not going down by Friday call office Standing order for labs today and Tuesday of next week  RTO 1/2/17 at [facility]  1. Tobacco Cessation Counseling: NA  2. Retake of BP if >140/90:   NA  3. Documentation to PCP/referring for new patient:  Sent to PCP at close of office visit  4. CAD patient on anti-platelet: NA  5. CAD patient on STATIN therapy:  NA  6. Patient with CHF and aFib on anticoagulation: NA      I appreciate the opportunity of cooperating in the care of this patient. Yaa Marinelli M.D., [facility]         If you have questions, please do not hesitate to call me. I look forward to following Rosendo along with you.     Sincerely,        Mariana King MD

## 2017-12-27 NOTE — PATIENT INSTRUCTIONS
Start Valsartan 40 mg at bedtime (1/2 of 80 mg tab)  Increase Spironolactone to 50 mg daily   Continue other medications  If weight not going down by Friday call office  Standing order for labs today and Tuesday of next week  RTO 1/2/17 at 1130

## 2017-12-27 NOTE — PROGRESS NOTES
[address]  Advanced CHF/Pulmonary Hypertension   Cardiac Follow up       Bev Stephens  YOB: 1965    Date of Visit:  12/27/17      Chief Complaint   Patient presents with    Congestive Heart Failure    Shortness of Breath     with any activity       History of Present Illness:  Bev Stephens is a 47 y.o. male who presents in follow up for management of CHF. 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 He declined wearing his life vest because it gets in the way of him doing his job and sent it back to the company. He LVEF 20-25% on echo 10/17. He is not on an ACEI due to renal dysfunction that he had in the hospital after bactrim. Today, he states he does feel like he has some labored breathing with walking. He is now taking Lasix 40 mg twice a day. Currently also taking digoxin, coreg, and spironolactone. His weight on his home scale has been fairly stable according to him; he has not lost any weight in the past week. He thinks he is still holding about 5 lbs of extra fluid. Although he does feel he may have gained a couple pounds with eating over the holidays. He was watching his sodium closely making sure it was less than 2 gm/day. He has slacked off a bit but plans to watch it more closely again. He denies chest pain, dizziness, or syncope. Has BLE edema today, right greater than left. He continues to have coughing with dry heaves. He will be following up with Dr Sumanth Caldwell on Tuesday of next week for his follow up of pneumocystis. His wife is here with him for visit today.       Allergies   Allergen Reactions    Phosphorus      Current Outpatient Prescriptions   Medication Sig Dispense Refill    potassium chloride (KLOR-CON M) 10 MEQ extended release tablet Take 1 tablet by mouth 2 times daily 60 tablet 3    spironolactone (ALDACTONE) 25 MG tablet Take 1 tablet by mouth daily 30 tablet 11    furosemide (LASIX)

## 2018-01-01 ENCOUNTER — TELEPHONE (OUTPATIENT)
Dept: CARDIOLOGY CLINIC | Age: 53
End: 2018-01-01

## 2018-01-01 ENCOUNTER — HOSPITAL ENCOUNTER (OUTPATIENT)
Dept: OTHER | Age: 53
Discharge: HOME | End: 2018-01-31
Attending: INTERNAL MEDICINE | Admitting: INTERNAL MEDICINE

## 2018-01-01 ENCOUNTER — OFFICE VISIT (OUTPATIENT)
Dept: INFECTIOUS DISEASES | Age: 53
End: 2018-01-01

## 2018-01-01 ENCOUNTER — OFFICE VISIT (OUTPATIENT)
Dept: CARDIOLOGY CLINIC | Age: 53
End: 2018-01-01

## 2018-01-01 ENCOUNTER — HOSPITAL ENCOUNTER (OUTPATIENT)
Dept: ONCOLOGY | Age: 53
Discharge: HOME | End: 2018-02-28
Attending: INTERNAL MEDICINE | Admitting: INTERNAL MEDICINE

## 2018-01-01 ENCOUNTER — OFFICE VISIT (OUTPATIENT)
Dept: INTERNAL MEDICINE CLINIC | Age: 53
End: 2018-01-01

## 2018-01-01 ENCOUNTER — HOSPITAL ENCOUNTER (OUTPATIENT)
Dept: OTHER | Age: 53
Discharge: HOME | End: 2018-04-11
Attending: INTERNAL MEDICINE | Admitting: INTERNAL MEDICINE

## 2018-01-01 ENCOUNTER — PATIENT MESSAGE (OUTPATIENT)
Dept: CARDIOLOGY CLINIC | Age: 53
End: 2018-01-01

## 2018-01-01 ENCOUNTER — OFFICE VISIT (OUTPATIENT)
Dept: PULMONOLOGY | Age: 53
End: 2018-01-01

## 2018-01-01 ENCOUNTER — TELEPHONE (OUTPATIENT)
Dept: OTHER | Age: 53
End: 2018-01-01

## 2018-01-01 ENCOUNTER — CARE COORDINATION (OUTPATIENT)
Dept: CASE MANAGEMENT | Age: 53
End: 2018-01-01

## 2018-01-01 ENCOUNTER — HOSPITAL ENCOUNTER (OUTPATIENT)
Dept: ONCOLOGY | Age: 53
Discharge: HOME | End: 2018-03-31
Attending: INTERNAL MEDICINE | Admitting: INTERNAL MEDICINE

## 2018-01-01 ENCOUNTER — HOSPITAL ENCOUNTER (OUTPATIENT)
Dept: OTHER | Age: 53
Discharge: HOME | End: 2018-01-05
Attending: INTERNAL MEDICINE | Admitting: INTERNAL MEDICINE

## 2018-01-01 ENCOUNTER — HOSPITAL ENCOUNTER (OUTPATIENT)
Dept: NON INVASIVE DIAGNOSTICS | Age: 53
Discharge: HOME | End: 2018-04-02
Attending: INTERNAL MEDICINE | Admitting: INTERNAL MEDICINE

## 2018-01-01 ENCOUNTER — HOSPITAL ENCOUNTER (OUTPATIENT)
Dept: ONCOLOGY | Age: 53
Discharge: HOME | End: 2018-04-30
Attending: INTERNAL MEDICINE | Admitting: INTERNAL MEDICINE

## 2018-01-01 ENCOUNTER — HOSPITAL ENCOUNTER (OUTPATIENT)
Dept: OTHER | Age: 53
Discharge: HOME | End: 2018-02-28
Attending: INTERNAL MEDICINE | Admitting: INTERNAL MEDICINE

## 2018-01-01 ENCOUNTER — TELEPHONE (OUTPATIENT)
Dept: INTERNAL MEDICINE CLINIC | Age: 53
End: 2018-01-01

## 2018-01-01 VITALS
BODY MASS INDEX: 33.23 KG/M2 | WEIGHT: 232.1 LBS | HEART RATE: 48 BPM | DIASTOLIC BLOOD PRESSURE: 80 MMHG | HEIGHT: 70 IN | SYSTOLIC BLOOD PRESSURE: 112 MMHG

## 2018-01-01 VITALS
HEART RATE: 72 BPM | BODY MASS INDEX: 31.78 KG/M2 | SYSTOLIC BLOOD PRESSURE: 80 MMHG | WEIGHT: 222 LBS | DIASTOLIC BLOOD PRESSURE: 62 MMHG | HEIGHT: 70 IN

## 2018-01-01 VITALS
HEIGHT: 70 IN | DIASTOLIC BLOOD PRESSURE: 84 MMHG | BODY MASS INDEX: 33.5 KG/M2 | WEIGHT: 234 LBS | SYSTOLIC BLOOD PRESSURE: 122 MMHG | HEART RATE: 56 BPM

## 2018-01-01 VITALS
SYSTOLIC BLOOD PRESSURE: 94 MMHG | HEIGHT: 70 IN | DIASTOLIC BLOOD PRESSURE: 62 MMHG | BODY MASS INDEX: 29.35 KG/M2 | WEIGHT: 205 LBS | HEART RATE: 72 BPM

## 2018-01-01 VITALS
WEIGHT: 222.12 LBS | DIASTOLIC BLOOD PRESSURE: 62 MMHG | HEART RATE: 88 BPM | HEIGHT: 70 IN | RESPIRATION RATE: 16 BRPM | SYSTOLIC BLOOD PRESSURE: 112 MMHG | BODY MASS INDEX: 31.8 KG/M2 | OXYGEN SATURATION: 98 %

## 2018-01-01 VITALS
BODY MASS INDEX: 32.93 KG/M2 | HEART RATE: 68 BPM | TEMPERATURE: 96.7 F | DIASTOLIC BLOOD PRESSURE: 66 MMHG | OXYGEN SATURATION: 93 % | WEIGHT: 230 LBS | HEIGHT: 70 IN | SYSTOLIC BLOOD PRESSURE: 100 MMHG

## 2018-01-01 VITALS
HEIGHT: 70 IN | DIASTOLIC BLOOD PRESSURE: 86 MMHG | BODY MASS INDEX: 36.22 KG/M2 | SYSTOLIC BLOOD PRESSURE: 112 MMHG | TEMPERATURE: 97.6 F | WEIGHT: 253 LBS | OXYGEN SATURATION: 97 % | HEART RATE: 86 BPM

## 2018-01-01 VITALS
HEART RATE: 74 BPM | WEIGHT: 257 LBS | SYSTOLIC BLOOD PRESSURE: 118 MMHG | BODY MASS INDEX: 36.88 KG/M2 | DIASTOLIC BLOOD PRESSURE: 62 MMHG | OXYGEN SATURATION: 95 %

## 2018-01-01 VITALS
HEART RATE: 64 BPM | WEIGHT: 253 LBS | HEIGHT: 70 IN | BODY MASS INDEX: 36.22 KG/M2 | SYSTOLIC BLOOD PRESSURE: 114 MMHG | DIASTOLIC BLOOD PRESSURE: 94 MMHG

## 2018-01-01 VITALS
HEART RATE: 60 BPM | WEIGHT: 223.7 LBS | HEIGHT: 70 IN | DIASTOLIC BLOOD PRESSURE: 78 MMHG | BODY MASS INDEX: 32.03 KG/M2 | SYSTOLIC BLOOD PRESSURE: 102 MMHG

## 2018-01-01 VITALS
HEART RATE: 68 BPM | BODY MASS INDEX: 32.5 KG/M2 | DIASTOLIC BLOOD PRESSURE: 76 MMHG | HEIGHT: 70 IN | SYSTOLIC BLOOD PRESSURE: 114 MMHG | WEIGHT: 227 LBS

## 2018-01-01 VITALS
HEIGHT: 70 IN | DIASTOLIC BLOOD PRESSURE: 82 MMHG | WEIGHT: 231.8 LBS | SYSTOLIC BLOOD PRESSURE: 112 MMHG | BODY MASS INDEX: 33.18 KG/M2 | OXYGEN SATURATION: 87 % | HEART RATE: 57 BPM

## 2018-01-01 VITALS — DIASTOLIC BLOOD PRESSURE: 84 MMHG | HEART RATE: 89 BPM | TEMPERATURE: 97.3 F | SYSTOLIC BLOOD PRESSURE: 108 MMHG

## 2018-01-01 VITALS — SYSTOLIC BLOOD PRESSURE: 122 MMHG | HEART RATE: 84 BPM | DIASTOLIC BLOOD PRESSURE: 80 MMHG

## 2018-01-01 DIAGNOSIS — C91.10 CHRONIC LYMPHOCYTIC LEUKEMIA OF B-CELL TYPE NOT HAVING ACHIEVED REMISSION (HCC): ICD-10-CM

## 2018-01-01 DIAGNOSIS — R06.02 SOB (SHORTNESS OF BREATH): Primary | ICD-10-CM

## 2018-01-01 DIAGNOSIS — I10 ESSENTIAL HYPERTENSION, BENIGN: Chronic | ICD-10-CM

## 2018-01-01 DIAGNOSIS — I50.43 ACUTE ON CHRONIC COMBINED SYSTOLIC AND DIASTOLIC HEART FAILURE (HCC): Primary | ICD-10-CM

## 2018-01-01 DIAGNOSIS — I50.22 SYSTOLIC CHF, CHRONIC (HCC): Chronic | ICD-10-CM

## 2018-01-01 DIAGNOSIS — E78.49 OTHER HYPERLIPIDEMIA: Chronic | ICD-10-CM

## 2018-01-01 DIAGNOSIS — R05.3 CHRONIC COUGH: ICD-10-CM

## 2018-01-01 DIAGNOSIS — R06.02 SOB (SHORTNESS OF BREATH): ICD-10-CM

## 2018-01-01 DIAGNOSIS — I42.9 CARDIOMYOPATHY, IDIOPATHIC (HCC): Chronic | ICD-10-CM

## 2018-01-01 DIAGNOSIS — B59 PNEUMONIA OF BOTH LUNGS DUE TO PNEUMOCYSTIS JIROVECII, UNSPECIFIED PART OF LUNG (HCC): Primary | ICD-10-CM

## 2018-01-01 DIAGNOSIS — E87.70 HYPERVOLEMIA, UNSPECIFIED HYPERVOLEMIA TYPE: ICD-10-CM

## 2018-01-01 DIAGNOSIS — K76.6 PAH (PULMONARY ARTERIAL HYPERTENSION) WITH PORTAL HYPERTENSION (HCC): Chronic | ICD-10-CM

## 2018-01-01 DIAGNOSIS — I50.9 CHRONIC CONGESTIVE HEART FAILURE, UNSPECIFIED CONGESTIVE HEART FAILURE TYPE: ICD-10-CM

## 2018-01-01 DIAGNOSIS — I50.22 CHRONIC SYSTOLIC HEART FAILURE (HCC): Primary | ICD-10-CM

## 2018-01-01 DIAGNOSIS — K76.6 PAH (PULMONARY ARTERIAL HYPERTENSION) WITH PORTAL HYPERTENSION (HCC): Primary | Chronic | ICD-10-CM

## 2018-01-01 DIAGNOSIS — J18.9 PNEUMONIA OF LEFT LUNG DUE TO INFECTIOUS ORGANISM, UNSPECIFIED PART OF LUNG: Primary | ICD-10-CM

## 2018-01-01 DIAGNOSIS — I50.23 ACUTE ON CHRONIC SYSTOLIC CHF (CONGESTIVE HEART FAILURE) (HCC): Chronic | ICD-10-CM

## 2018-01-01 DIAGNOSIS — I27.21 PAH (PULMONARY ARTERIAL HYPERTENSION) WITH PORTAL HYPERTENSION (HCC): Chronic | ICD-10-CM

## 2018-01-01 DIAGNOSIS — R60.0 LOCALIZED EDEMA: ICD-10-CM

## 2018-01-01 DIAGNOSIS — E66.9 OBESITY (BMI 30-39.9): Chronic | ICD-10-CM

## 2018-01-01 DIAGNOSIS — I42.8 NICM (NONISCHEMIC CARDIOMYOPATHY) (HCC): Primary | Chronic | ICD-10-CM

## 2018-01-01 DIAGNOSIS — I50.22 SYSTOLIC CHF, CHRONIC (HCC): Primary | Chronic | ICD-10-CM

## 2018-01-01 DIAGNOSIS — Z71.3 WEIGHT LOSS COUNSELING, ENCOUNTER FOR: ICD-10-CM

## 2018-01-01 DIAGNOSIS — R60.0 LOCALIZED EDEMA: Primary | ICD-10-CM

## 2018-01-01 DIAGNOSIS — E66.09 CLASS 2 OBESITY DUE TO EXCESS CALORIES WITHOUT SERIOUS COMORBIDITY WITH BODY MASS INDEX (BMI) OF 36.0 TO 36.9 IN ADULT: ICD-10-CM

## 2018-01-01 DIAGNOSIS — L98.9 SKIN LESIONS: ICD-10-CM

## 2018-01-01 DIAGNOSIS — D72.820 LYMPHOCYTOSIS: ICD-10-CM

## 2018-01-01 DIAGNOSIS — I27.21 PAH (PULMONARY ARTERIAL HYPERTENSION) WITH PORTAL HYPERTENSION (HCC): Primary | Chronic | ICD-10-CM

## 2018-01-01 DIAGNOSIS — R05.9 COUGH: Primary | ICD-10-CM

## 2018-01-01 DIAGNOSIS — I34.0 NON-RHEUMATIC MITRAL REGURGITATION: Chronic | ICD-10-CM

## 2018-01-01 DIAGNOSIS — I42.8 OTHER CARDIOMYOPATHIES (CODE): ICD-10-CM

## 2018-01-01 DIAGNOSIS — I10 ESSENTIAL HYPERTENSION: Chronic | ICD-10-CM

## 2018-01-01 DIAGNOSIS — I48.91 ATRIAL FIBRILLATION, UNSPECIFIED TYPE (HCC): Primary | ICD-10-CM

## 2018-01-01 DIAGNOSIS — E78.49 OTHER HYPERLIPIDEMIA: Primary | Chronic | ICD-10-CM

## 2018-01-01 DIAGNOSIS — N28.9 ACUTE RENAL INSUFFICIENCY: ICD-10-CM

## 2018-01-01 DIAGNOSIS — R18.8 OTHER ASCITES: ICD-10-CM

## 2018-01-01 DIAGNOSIS — I50.22 SYSTOLIC CHF, CHRONIC (HCC): Primary | ICD-10-CM

## 2018-01-01 DIAGNOSIS — I50.23 ACUTE ON CHRONIC SYSTOLIC CHF (CONGESTIVE HEART FAILURE) (HCC): Primary | Chronic | ICD-10-CM

## 2018-01-01 DIAGNOSIS — I42.8 NICM (NONISCHEMIC CARDIOMYOPATHY) (HCC): ICD-10-CM

## 2018-01-01 DIAGNOSIS — I50.21 ACUTE SYSTOLIC HEART FAILURE (HCC): Primary | ICD-10-CM

## 2018-01-01 DIAGNOSIS — R06.02 SHORTNESS OF BREATH: ICD-10-CM

## 2018-01-01 DIAGNOSIS — N17.9 AKI (ACUTE KIDNEY INJURY) (HCC): ICD-10-CM

## 2018-01-01 LAB
ANION GAP SERPL CALCULATED.3IONS-SCNC: 13 MMOL/L (ref 3–16)
ANION GAP SERPL CALCULATED.3IONS-SCNC: 15 MMOL/L (ref 3–16)
ANION GAP SERPL CALCULATED.3IONS-SCNC: 17 MMOL/L (ref 3–16)
ANION GAP SERPL CALCULATED.3IONS-SCNC: 19 MMOL/L (ref 3–16)
BUN BLDV-MCNC: 13 MG/DL (ref 7–20)
BUN BLDV-MCNC: 18 MG/DL (ref 7–20)
BUN BLDV-MCNC: 27 MG/DL (ref 7–20)
BUN BLDV-MCNC: 40 MG/DL (ref 7–20)
CALCIUM SERPL-MCNC: 8.8 MG/DL (ref 8.3–10.6)
CALCIUM SERPL-MCNC: 8.9 MG/DL (ref 8.3–10.6)
CALCIUM SERPL-MCNC: 9.1 MG/DL (ref 8.3–10.6)
CALCIUM SERPL-MCNC: 9.2 MG/DL (ref 8.3–10.6)
CHLORIDE BLD-SCNC: 102 MMOL/L (ref 99–110)
CHLORIDE BLD-SCNC: 103 MMOL/L (ref 99–110)
CHLORIDE BLD-SCNC: 89 MMOL/L (ref 99–110)
CHLORIDE BLD-SCNC: 97 MMOL/L (ref 99–110)
CO2: 25 MMOL/L (ref 21–32)
CO2: 25 MMOL/L (ref 21–32)
CO2: 26 MMOL/L (ref 21–32)
CO2: 32 MMOL/L (ref 21–32)
CREAT SERPL-MCNC: 1.1 MG/DL (ref 0.9–1.3)
CREAT SERPL-MCNC: 1.2 MG/DL (ref 0.9–1.3)
CREAT SERPL-MCNC: 1.3 MG/DL (ref 0.9–1.3)
CREAT SERPL-MCNC: 1.8 MG/DL (ref 0.9–1.3)
GFR AFRICAN AMERICAN: 48
GFR AFRICAN AMERICAN: >60
GFR NON-AFRICAN AMERICAN: 40
GFR NON-AFRICAN AMERICAN: 58
GFR NON-AFRICAN AMERICAN: >60
GFR NON-AFRICAN AMERICAN: >60
GLUCOSE BLD-MCNC: 100 MG/DL (ref 70–99)
GLUCOSE BLD-MCNC: 135 MG/DL (ref 70–99)
GLUCOSE BLD-MCNC: 80 MG/DL (ref 70–99)
GLUCOSE BLD-MCNC: 82 MG/DL (ref 70–99)
LEFT VENTRICULAR EJECTION FRACTION HIGH VALUE: 25 %
LEFT VENTRICULAR EJECTION FRACTION MODE: NORMAL
POTASSIUM SERPL-SCNC: 2.6 MMOL/L (ref 3.5–5.1)
POTASSIUM SERPL-SCNC: 3.6 MMOL/L (ref 3.5–5.1)
POTASSIUM SERPL-SCNC: 3.8 MMOL/L (ref 3.5–5.1)
POTASSIUM SERPL-SCNC: 5.6 MMOL/L (ref 3.5–5.1)
PRO-BNP: 2658 PG/ML (ref 0–124)
PRO-BNP: 3933 PG/ML (ref 0–124)
PRO-BNP: 4235 PG/ML (ref 0–124)
PRO-BNP: 8132 PG/ML (ref 0–124)
SODIUM BLD-SCNC: 135 MMOL/L (ref 136–145)
SODIUM BLD-SCNC: 140 MMOL/L (ref 136–145)
SODIUM BLD-SCNC: 144 MMOL/L (ref 136–145)
SODIUM BLD-SCNC: 144 MMOL/L (ref 136–145)

## 2018-01-01 PROCEDURE — 1111F DSCHRG MED/CURRENT MED MERGE: CPT | Performed by: INTERNAL MEDICINE

## 2018-01-01 PROCEDURE — 87804 INFLUENZA ASSAY W/OPTIC: CPT | Performed by: INTERNAL MEDICINE

## 2018-01-01 PROCEDURE — 99213 OFFICE O/P EST LOW 20 MIN: CPT | Performed by: INTERNAL MEDICINE

## 2018-01-01 PROCEDURE — 99214 OFFICE O/P EST MOD 30 MIN: CPT | Performed by: INTERNAL MEDICINE

## 2018-01-01 PROCEDURE — 99215 OFFICE O/P EST HI 40 MIN: CPT | Performed by: CLINICAL NURSE SPECIALIST

## 2018-01-01 PROCEDURE — 99215 OFFICE O/P EST HI 40 MIN: CPT | Performed by: INTERNAL MEDICINE

## 2018-01-01 PROCEDURE — 93000 ELECTROCARDIOGRAM COMPLETE: CPT | Performed by: INTERNAL MEDICINE

## 2018-01-01 RX ORDER — FUROSEMIDE 10 MG/ML
120 INJECTION INTRAMUSCULAR; INTRAVENOUS ONCE
Status: COMPLETED | OUTPATIENT
Start: 2018-01-01 | End: 2018-01-01

## 2018-01-01 RX ORDER — POTASSIUM CHLORIDE 1.5 G/1.77G
40 POWDER, FOR SOLUTION ORAL DAILY
Qty: 10 EACH | Refills: 0 | Status: SHIPPED | OUTPATIENT
Start: 2018-01-01

## 2018-01-01 RX ORDER — FUROSEMIDE 10 MG/ML
INJECTION INTRAMUSCULAR; INTRAVENOUS
Status: DISPENSED
Start: 2018-01-01 | End: 2018-01-01

## 2018-01-01 RX ORDER — VALSARTAN 80 MG/1
80 TABLET ORAL DAILY
Qty: 30 TABLET | Refills: 11 | Status: ON HOLD | OUTPATIENT
Start: 2018-01-01 | End: 2018-01-01 | Stop reason: HOSPADM

## 2018-01-01 RX ORDER — FUROSEMIDE 10 MG/ML
INJECTION INTRAMUSCULAR; INTRAVENOUS
Status: COMPLETED
Start: 2018-01-01 | End: 2018-01-01

## 2018-01-01 RX ORDER — CARVEDILOL 3.12 MG/1
TABLET ORAL
Qty: 90 TABLET | Refills: 3 | Status: SHIPPED | OUTPATIENT
Start: 2018-01-01 | End: 2018-01-01 | Stop reason: ALTCHOICE

## 2018-01-01 RX ORDER — METOLAZONE 2.5 MG/1
2.5 TABLET ORAL DAILY
Qty: 10 TABLET | Refills: 0 | Status: ON HOLD | OUTPATIENT
Start: 2018-01-01 | End: 2018-01-01 | Stop reason: HOSPADM

## 2018-01-01 RX ORDER — SPIRONOLACTONE 25 MG/1
12.5 TABLET ORAL DAILY
COMMUNITY
End: 2018-01-01 | Stop reason: SDUPTHER

## 2018-01-01 RX ORDER — FUROSEMIDE 10 MG/ML
120 INJECTION INTRAMUSCULAR; INTRAVENOUS ONCE
Status: CANCELLED
Start: 2018-01-01 | End: 2018-01-01

## 2018-01-01 RX ORDER — METOLAZONE 2.5 MG/1
2.5 TABLET ORAL DAILY
Qty: 10 TABLET | Refills: 0 | Status: SHIPPED | OUTPATIENT
Start: 2018-01-01 | End: 2018-01-01 | Stop reason: SDUPTHER

## 2018-01-01 RX ORDER — SODIUM CHLORIDE 0.9 % (FLUSH) 0.9 %
SYRINGE (ML) INJECTION
Status: DISPENSED
Start: 2018-01-01 | End: 2018-01-01

## 2018-01-01 RX ORDER — SPIRONOLACTONE 25 MG/1
12.5 TABLET ORAL DAILY
Qty: 30 TABLET | Refills: 1 | Status: SHIPPED | OUTPATIENT
Start: 2018-01-01

## 2018-01-01 RX ORDER — POTASSIUM CHLORIDE 1.5 G/1.77G
40 POWDER, FOR SOLUTION ORAL DAILY
COMMUNITY
End: 2018-01-01 | Stop reason: SDUPTHER

## 2018-01-01 RX ORDER — CARVEDILOL 6.25 MG/1
6.25 TABLET ORAL 2 TIMES DAILY WITH MEALS
Qty: 60 TABLET | Refills: 3 | Status: ON HOLD | OUTPATIENT
Start: 2018-01-01 | End: 2018-01-01 | Stop reason: HOSPADM

## 2018-01-01 RX ORDER — HYDROXYZINE HYDROCHLORIDE 25 MG/1
25 TABLET, FILM COATED ORAL 3 TIMES DAILY PRN
Qty: 90 TABLET | Refills: 1 | Status: ON HOLD | OUTPATIENT
Start: 2018-01-01 | End: 2018-01-01 | Stop reason: HOSPADM

## 2018-01-01 RX ADMIN — FUROSEMIDE 120 MG: 10 INJECTION INTRAMUSCULAR; INTRAVENOUS at 11:33

## 2018-01-01 ASSESSMENT — ENCOUNTER SYMPTOMS
EYE DISCHARGE: 0
SORE THROAT: 0
BACK PAIN: 0
SPUTUM PRODUCTION: 0
EYE REDNESS: 0
EYE REDNESS: 0
WHEEZING: 0
BLURRED VISION: 0
WHEEZING: 0
NAUSEA: 0
CONSTIPATION: 0
DIARRHEA: 0
ABDOMINAL PAIN: 0
WHEEZING: 0
EYE DISCHARGE: 0
ABDOMINAL PAIN: 0
BLURRED VISION: 0
SHORTNESS OF BREATH: 0
SPUTUM PRODUCTION: 0
SHORTNESS OF BREATH: 1
NAUSEA: 0
HEMOPTYSIS: 0
COUGH: 0
HEMOPTYSIS: 0
SORE THROAT: 0
COUGH: 1
CONSTIPATION: 0
DOUBLE VISION: 0
DIARRHEA: 0
DOUBLE VISION: 0
SHORTNESS OF BREATH: 0
BACK PAIN: 0

## 2018-01-02 NOTE — LETTER
[facility]  [address]  [facility]  Phone: [phone]  Fax: [phone]    Afshan Salinas MD        January 2, 2018     Sharda Pritchard, [address] [place] [address] [address]    Patient: [facility] [place]  MR Number: [id number]  YOB: 1965  Date of Visit: 1/2/2018    Dear Dr. Sharda Pritchard: Thank you for the request for consultation for Rosendo Angulo to me for the evaluation of PCP pneumonia. Below are the relevant portions of my assessment and plan of care. If you have questions, please do not hesitate to call me. I look forward to following Rosendo along with you.     Sincerely,        Afshan Salinas MD

## 2018-01-02 NOTE — PATIENT INSTRUCTIONS
Increase Valsartan from 40 to 80 mg at bedtime. Start Atarax 25 mfg TID as needed for itching. Call if itching remains after taking medication for it. We will call lab results to him and discuss any change in care. RTO in 3 weeks.

## 2018-01-02 NOTE — PROGRESS NOTES
tablet by mouth 2 times daily 180 tablet 3    carvedilol (COREG) 3.125 MG tablet Take 1 tablet by mouth 2 times daily (with meals) 60 tablet 3    digoxin (LANOXIN) 125 MCG tablet Take 1 tablet by mouth daily 30 tablet 3     No current facility-administered medications for this visit. Past Medical History:   Diagnosis Date    [facility]) [facility] [address]     Essential hypertension 11/2/2011    Hypertension     [facility]oid [facility]omatosis ([facility] [address] [facility]     Onychomycosis     Stage 2 chronic kidney disease      Past Surgical History:   Procedure Laterality Date    BRONCHOSCOPY  10/20/2017    with BAL    EYE SURGERY       Family History   Problem Relation Age of Onset    Diabetes Mother     Hypertension Mother     Heart Disease Mother     Stroke Maternal Grandfather     Heart Disease Maternal Grandfather      CAD    Heart Disease Brother     No Known Problems Father      Social History     Social History    Marital status:      Spouse name: N/A    Number of children: N/A    Years of education: N/A     Occupational History    Not on file. Social History Main Topics    Smoking status: Never Smoker    Smokeless tobacco: Never Used    Alcohol use No    Drug use: No    Sexual activity: Yes     Partners: Female     Other Topics Concern    Not on file     Social History Narrative    No narrative on file       Review of Systems:   · Constitutional: there has been no unanticipated weight loss. There's been no change in energy level, sleep pattern, or activity level. · Eyes: No visual changes or diplopia. No scleral icterus. · ENT: No Headaches, hearing loss or vertigo. No mouth sores or sore throat. · Cardiovascular: Reviewed in HPI  · Respiratory: No wheezing, no sputum production. No hematemesis. +occassinal cough, improved  · Gastrointestinal: No abdominal pain, appetite loss, blood in stools. No change in bowel or bladder habits.   · Genitourinary: No dysuria, trouble

## 2018-01-02 NOTE — PROGRESS NOTES
BRONCHOSCOPY  10/20/2017    with BAL    EYE SURGERY         Current Medications: All medications were reviewed by me during this visit  Current Outpatient Prescriptions   Medication Sig Dispense Refill    spironolactone (ALDACTONE) 50 MG tablet Take 1 tablet by mouth daily 30 tablet 5    valsartan (DIOVAN) 80 MG tablet Take 0.5 tablets by mouth daily 30 tablet 5    potassium chloride (KLOR-CON M) 10 MEQ extended release tablet Take 1 tablet by mouth 2 times daily 60 tablet 3    furosemide (LASIX) 40 MG tablet Take 1 tablet by mouth 2 times daily 180 tablet 3    carvedilol (COREG) 3.125 MG tablet Take 1 tablet by mouth 2 times daily (with meals) 60 tablet 3    digoxin (LANOXIN) 125 MCG tablet Take 1 tablet by mouth daily 30 tablet 3     No current facility-administered medications for this visit. Family history: All family history was reviewed by me today    Family History   Problem Relation Age of Onset    Diabetes Mother     Hypertension Mother     Heart Disease Mother     Stroke Maternal Grandfather     Heart Disease Maternal Grandfather      CAD    Heart Disease Brother     No Known Problems Father        No family history of immunocompromising or autoimmune conditions. No h/o TB in in family or contacts    Social History:  All social and epidemiologic history was reviewed and updated be me in detail today. Social History     Social History    Marital status:      Spouse name: N/A    Number of children: N/A    Years of education: N/A     Social History Main Topics    Smoking status: Never Smoker    Smokeless tobacco: Never Used    Alcohol use No    Drug use: No    Sexual activity: Yes     Partners: Female     Other Topics Concern    None     Social History Narrative    None       Immunizations: All immunizations were reviewed by me in detail.      Immunization History   Administered Date(s) Administered    Tdap (Boostrix, Adacel) 01/07/2013         REVIEW OF SYSTEMS: Review of Systems   Constitutional: Negative for chills, diaphoresis, fever and malaise/fatigue. HENT: Negative for ear discharge, ear pain and sore throat. Eyes: Negative for blurred vision, double vision, discharge and redness. Respiratory: Positive for cough. Negative for hemoptysis, sputum production, shortness of breath and wheezing. Cardiovascular: Negative for chest pain and leg swelling. Gastrointestinal: Negative for abdominal pain, constipation, diarrhea and nausea. Genitourinary: Negative for dysuria, flank pain, frequency, hematuria and urgency. Musculoskeletal: Negative for back pain, joint pain and myalgias. Skin: Negative for itching and rash. Multiple skin lesions on back and some on chest which are itching but not painful   Neurological: Negative for dizziness, tingling, speech change, focal weakness, seizures and headaches. Endo/Heme/Allergies: Does not bruise/bleed easily. Psychiatric/Behavioral: Negative for depression and hallucinations. All other systems reviewed and are negative. PHYSICAL EXAM:      Vitals:    01/02/18 1213   BP: 112/86   Pulse: 86   Temp: 97.6 °F (36.4 °C)   SpO2: 97%       Physical Exam   Constitutional: He is oriented to person, place, and time. He appears well-developed and well-nourished. No distress. HENT:   Head: Normocephalic and atraumatic. Mouth/Throat: Oropharynx is clear and moist. No oropharyngeal exudate. Eyes: Conjunctivae and EOM are normal. Pupils are equal, round, and reactive to light. Right eye exhibits no discharge. Left eye exhibits no discharge. No scleral icterus. Neck: Normal range of motion. Neck supple. No thyromegaly present. Cardiovascular: Normal rate, regular rhythm and normal heart sounds. No murmur heard. Pulmonary/Chest: Effort normal and breath sounds normal. He has no wheezes. He has no rales. Abdominal: Soft. Bowel sounds are normal. There is no tenderness.  There is no rebound and more than 15 percent of  body weight and staying at this weight is an extremely good result, even if you never reach your \"dream\" or \"ideal\" weight. Lifestyle changes including changing eating habits, substituting excess carbohydrates with proteins, stress reduction, using self-help programs like Weight Watchers®, Overeaters Anonymous®, and Take Off Pounds Sensibly (TOPS)© , following DASH diet and increasing exercise or walking briskly daily for half hour to and hour 5-7 days a week was suggested among other measures. Information was given about various weight loss education programs and their websites like www.cdc.gov/healthyweight, www.choosemyplate.gov and www.health.gov/dietaryguidelines/      Follow-up:    1. Follow-up with me in ID clinic in 2 months      TIME SPENT TODAY:     - Spent over 26 minutes on visit (including interval history, physical exam, review of data including labs, cultures, imaging, development and implementation of treatment plan and coordination of care). - Over 50% of time spent with pt counseling and education. Please note that this chart was generated using Dragon dictation software. Although every effort was made to ensure the accuracy of this automated transcription, some errors in transcription may have occurred inadvertently. If you may need any clarification, please do not hesitate to contact me through EPIC or at the phone number provided below with my electronic signature. Thankyou for involving me in the care of your patient. If you have any additional questions, please do not hesitate to contact me.       Gopi Tomlinson MD, MPH  1/2/2018, 1:24 PM  [facility]   [address] [place] [address], [address]  Office: [phone]  Fax: [phone]

## 2018-01-02 NOTE — COMMUNICATION BODY
[facility]  Advanced CHF/Pulmonary Hypertension   Cardiac Follow up       Bev Stephens  YOB: 1965    Date of Visit:  12/27/17      Chief Complaint   Patient presents with    Congestive Heart Failure    Shortness of Breath     with any activity       History of Present Illness:  Bev Stephens is a 47 y.o. male who presents in follow up for management of CHF. 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 He declined wearing his life vest because it gets in the way of him doing his job and sent it back to the company. He LVEF 20-25% on echo 10/17. He is not on an ACEI due to renal dysfunction that he had in the hospital after bactrim. Today, he states he does feel like he has some labored breathing with walking. He is now taking Lasix 40 mg twice a day. Currently also taking digoxin, coreg, and spironolactone. His weight on his home scale has been fairly stable according to him; he has not lost any weight in the past week. He thinks he is still holding about 5 lbs of extra fluid. Although he does feel he may have gained a couple pounds with eating over the holidays. He was watching his sodium closely making sure it was less than 2 gm/day. He has slacked off a bit but plans to watch it more closely again. He denies chest pain, dizziness, or syncope. Has BLE edema today, right greater than left. He continues to have coughing with dry heaves. He will be following up with Dr Modesto Barksdale on Tuesday of next week for his follow up of pneumocystis. His wife is here with him for visit today.       Allergies   Allergen Reactions    Phosphorus      Current Outpatient Prescriptions   Medication Sig Dispense Refill    potassium chloride (KLOR-CON M) 10 MEQ extended release tablet Take 1 tablet by mouth 2 times daily 60 tablet 3    spironolactone (ALDACTONE) 25 MG tablet Take 1 tablet by mouth daily 30 tablet 11    furosemide (LASIX) 40 MG tablet Take 1 tablet by mouth 2 times daily 180 tablet 3    carvedilol (COREG) 3.125 MG tablet Take 1 tablet by mouth 2 times daily (with meals) 60 tablet 3    digoxin (LANOXIN) 125 MCG tablet Take 1 tablet by mouth daily 30 tablet 3     No current facility-administered medications for this visit. Past Medical History:   Diagnosis Date    [facility]     Essential hypertension 11/2/2011    Hypertension     [facility]oid granulomatosis ([facility]     Onychomycosis     Stage 2 chronic kidney disease      Past Surgical History:   Procedure Laterality Date    BRONCHOSCOPY  10/20/2017    with BAL    EYE SURGERY       Family History   Problem Relation Age of Onset    Diabetes Mother     Hypertension Mother     Heart Disease Mother     Stroke Maternal Grandfather     Heart Disease Maternal Grandfather      CAD    Heart Disease Brother     No Known Problems Father      Social History     Social History    Marital status:      Spouse name: N/A    Number of children: N/A    Years of education: N/A     Occupational History    Not on file. Social History Main Topics    Smoking status: Never Smoker    Smokeless tobacco: Never Used    Alcohol use No    Drug use: No    Sexual activity: Yes     Partners: Female     Other Topics Concern    Not on file     Social History Narrative    No narrative on file       Review of Systems:   · Constitutional: there has been no unanticipated weight loss. There's been no change in energy level, sleep pattern, or activity level. · Eyes: No visual changes or diplopia. No scleral icterus. · ENT: No Headaches, hearing loss or vertigo. No mouth sores or sore throat. · Cardiovascular: Reviewed in HPI  · Respiratory: No wheezing, no sputum production. No hematemesis. +Coughing  · Gastrointestinal: No abdominal pain, appetite loss, blood in stools. No change in bowel or bladder habits.   · Genitourinary: No dysuria, trouble voiding, or hematuria. · Musculoskeletal:  No gait disturbance, weakness or joint complaints. · Integumentary: No rash or pruritis. · Neurological: No headache, diplopia, change in muscle strength, numbness or tingling. No change in gait, balance, coordination, mood, affect, memory, mentation, behavior. · Psychiatric: No anxiety, no depression. · Endocrine: No malaise, fatigue or temperature intolerance. No excessive thirst, fluid intake, or urination. No tremor. · Hematologic/Lymphatic: No abnormal bruising or bleeding, blood clots or swollen lymph nodes. · Allergic/Immunologic: No nasal congestion or hives. Physical Examination:      Wt Readings from Last 3 Encounters:   12/27/17 250 lb (113.4 kg)   12/22/17 245 lb 12.8 oz (111.5 kg)   12/21/17 245 lb 1.9 oz (111.2 kg)     BP Readings from Last 3 Encounters:   12/27/17 112/78   12/22/17 98/66   12/21/17 108/60     Constitutional and General Appearance:   WD/WN in NAD  HEENT:  NC/AT  JUN  No problems with hearing  Skin:  Warm, dry  Respiratory:  · Normal excursion and expansion without use of accessory muscles  · Resp Auscultation: Normal breath sounds without dullness  Cardiovascular:  · The apical impulses not displaced  · Heart tones are crisp and normal  · Cervical veins are not engorged  · The carotid upstroke is normal in amplitude and contour without delay or bruit  · JVP less than 8 cm H2O  RRR with nl S1 and S2 without m,r,g  · Peripheral pulses are symmetrical and full  · There is no clubbing, cyanosis of the extremities.   · BLE edema  · Femoral Arteries: 2+ and equal  · Pedal Pulses: 2+ and equal   Neck:  · No thyromegaly  Abdomen: increased abdominal girth consistent with ascites  · No masses or tenderness  · Liver/Spleen: No Abnormalities Noted  Neurological/Psychiatric:  · Alert and oriented in all spheres  · Moves all extremities well  · Exhibits normal gait balance and coordination  · No abnormalities of mood, affect, memory, mentation, or behavior are noted    Echo 8/22/17    Summary   Four chamber cardiac enlargement.   -Borderline concentric left ventricular hypertrophy is present.   -Global ejection fraction is decreased and estimated 40 %.  -There is mild hypokinesis of the basal inferior wall.   -There are increased E/A velocities consistent with restrictive diastolic   filling (Grade III) . E/e'= 11.45 .   -Mitral annular calcification is present.   -Moderate to severe mitral regurgitation is present.   -There is moderate tricuspid regurgitation with RVSP estimated at 46 mmHg.   -Dilated left atrium with a volume of 80 ml.   -The right heart is moderately enlarged.   -Right ventricular systolic function appears reduced . Assessment:    1. Systolic CHF, chronic (HCC) - Needs diuresis. Start Valsartan 40 mg and increase Spironolactone to 50 mg. Recheck labs today and Tuesday    2. Essential hypertension - /78   Pulse 56   Ht 5' 10\" (1.778 m)   Wt 250 lb (113.4 kg)   BMI 35.87 kg/m²  stable    3. SOB (shortness of breath) - Labored with walking. Needs more diuresis. 4. Cardiomyopathy, unspecified type ([address] - Echo 8/22/17 EF 40%    5. PAH (pulmonary arterial hypertension) with portal hypertension ([address] - Echo 8/22/17 showed RVSP 46 mmHg        Plan:  Start Valsartan 40 mg at bedtime (1/2 of 80 mg tab)  Increase Spironolactone to 50 mg daily   Continue other medications  If weight not going down by Friday call office  Standing order for labs today and Tuesday of next week  RTO 1/2/17 at [address] [place] [address]  1. Tobacco Cessation Counseling: NA  2. Retake of BP if >140/90:   NA  3. Documentation to PCP/referring for new patient:  Sent to PCP at close of office visit  4. CAD patient on anti-platelet: NA  5. CAD patient on STATIN therapy:  NA  6. Patient with CHF and aFib on anticoagulation: NA      I appreciate the opportunity of cooperating in the care of this patient.     Loco Ambrose M.D., [facility]

## 2018-01-03 NOTE — TELEPHONE ENCOUNTER
Spoke with patient and relayed information;  Notes Recorded by Iman Lomas MD on 1/2/2018 at 11:06 PM EST  Call pt. [facility] labs look good.  I want him to increase his lasix to 2 in the am and 1 in the pm as we mentioned in office today.  No other changes.  Get labs in 1 week again.     Patient states understanding/ orders placed

## 2018-01-05 PROBLEM — R60.0 LOCALIZED EDEMA: Status: ACTIVE | Noted: 2018-01-01

## 2018-01-05 PROBLEM — R05.3 CHRONIC COUGH: Status: ACTIVE | Noted: 2017-01-01

## 2018-01-05 NOTE — PROGRESS NOTES
[facility]  Progress Note  Deepika Caro MD       Rosendo Varela   YOB: 1965    Date of Visit:  1/5/2018    Assessment/Plan:  1. Cough & 2. SOB (shortness of breath)  Symptoms are better but starting to come back  [place] the combination of his CHF and PJP    3. 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 CT chest last month showed some air trapping but no infiltrate or effusion    4. Cardiomyopathy, unspecified type ([address]  EF40%  Had diuretic increased by Cardiology  Has some edema still  C/o Scrotal edema  Still has cough  ? CHF  Check CXR  He is going to see Dr Fazal Lee next      FOLLOW UP: 3 months      Chief Complaint   Patient presents with    Follow-up     retaining alot of fluid right now. Dr Fazal Lee is working him in so he can address this    Results     of his CT done in Dec.       HPI  The patient presents with a chief complaint of cough and shortness of breath. He was diagnosed with PJP at [address] He also was diagnosed with CHF and is following with cardiology. Cough and SOB are persistent. He has edema which seems to be worsening. He follows with cardiology and they have increased his Lasix to 80/40    Review of Systems  As documented in HPI     Physical Exam:  Well developed, well nourished  Alert and oriented  Sclera is clear  No cervical adenopathy  No JVD. Chest examination is clear. Cardiac examination reveals regular rate and rhythm without murmur, gallop or rub. The abdomen is soft, nontender and nondistended. There is no clubbing, cyanosis or edema of the extremities. There is no obvious skin rash. No focal neuro deficicts  Normal mood and affect    Allergies   Allergen Reactions    Phosphorus      Prior to Visit Medications    Medication Sig Taking?  Authorizing Provider   valsartan (DIOVAN) 80 MG tablet Take 1 tablet by mouth daily  [place] MD hydrOXYzine (ATARAX) 25 MG tablet Take 1 tablet by mouth 3 times daily as needed for Itching  Yahaira Harmon MD   spironolactone (ALDACTONE) 50 MG tablet Take 1 tablet by mouth daily  Yahaira Harmon MD   potassium chloride (KLOR-CON M) 10 MEQ extended release tablet Take 1 tablet by mouth 2 times daily  [facility]   furosemide (LASIX) 40 MG tablet Take 1 tablet by mouth 2 times daily  Yahaira Harmon MD   carvedilol (COREG) 3.125 MG tablet Take 1 tablet by mouth 2 times daily (with meals)  Daniel Dahl MD   digoxin (LANOXIN) 125 MCG tablet Take 1 tablet by mouth daily  Daniel Dahl MD       Vitals:    01/05/18 1536   BP: 118/62   Pulse: 74   SpO2: 95%   Weight: 257 lb (116.6 kg)     Body mass index is 36.88 kg/m².      Wt Readings from Last 3 Encounters:   01/05/18 257 lb (116.6 kg)   01/02/18 253 lb (114.8 kg)   01/02/18 253 lb (114.8 kg)     BP Readings from Last 3 Encounters:   01/05/18 118/62   01/02/18 (!) 114/94   01/02/18 112/86        History   Smoking Status    Never Smoker   Smokeless Tobacco    Never Used

## 2018-01-05 NOTE — TELEPHONE ENCOUNTER
Spoke with patient's wife. He has an appointment at 3:15 with Dr. Adrianna Roberts and then will come this way. Advised her that patient needs to be here by 4:15 pm to be seen.

## 2018-01-05 NOTE — Clinical Note
Chip,  I sent him for CXR. I wonder if cough, scrotal edema and LE edema are all related to CHF?  Cardiology has him on Lasix 80/40  Tift

## 2018-01-09 PROBLEM — J18.9 PNEUMONIA: Status: ACTIVE | Noted: 2018-01-01

## 2018-01-09 NOTE — PROGRESS NOTES
Subjective:      Patient ID: Farshad Khoury is a 47 y.o. male. HPI  Patient does not feel well today. He comes to the office with intractable coughing. His cough is nonproductive. It has been going on for the last week or so. It was so severe that he went to the pulmonary specialist 5 days ago. Chest x-ray showed that he may have had some infiltrate on chest x-ray but in reviewing the chest x-ray, it does not appear to be that bad. There is noted to be significant cardiomegaly however. This is a patient who has a history of chronic leukocytic leukemia that is not in blast crisis at this time although his white blood cell count typically runs in the 145,000 range. Patient denies having fever but he is unable to sleep because of nasal drainage and persistent cough. Patient has been seen by the cardiologist recently and he has also been referred to the liver specialist because of some fatty infiltration of the liver and the presence of ascites, likely due to portal hypertension resulting from pulmonary hypertension. The patient was in the room and began vomiting because he was having difficulty with controlling his cough. He also has some scrotal edema which is very impressive. He also has some shifting dullness on examination. 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 Respiratory: Positive for shortness of breath. Negative for wheezing. Cardiovascular: Positive for palpitations and leg swelling. Negative for chest pain. Objective:   Physical Exam   Constitutional: He is oriented to person, place, and time. He appears well-developed and well-nourished. No distress.    HENT: Head: Normocephalic and atraumatic. Right Ear: External ear normal.   Left Ear: External ear normal.   Nose: Nose normal.   Mouth/Throat: Oropharynx is clear and moist. No oropharyngeal exudate. Eyes: Conjunctivae and EOM are normal. Pupils are equal, round, and reactive to light. Neck: Normal range of motion. Neck supple. No JVD present. No tracheal deviation present. No thyromegaly present. Cardiovascular: Normal rate, regular rhythm, normal heart sounds and intact distal pulses. Exam reveals no gallop and no friction rub. No murmur heard. Pulmonary/Chest: Effort normal. No stridor. No respiratory distress. He has wheezes. He has no rales. He exhibits no tenderness. Breath sounds are diminished diffusely. His exam is remarkable for the lack of rales or rhonchi. Abdominal: Soft. Bowel sounds are normal. He exhibits distension. Shifting dullness, no evidence of tenderness to palpation. The liver expansion is in normal range. There is no evidence of splenomegaly on physical exam   Musculoskeletal: Normal range of motion. He exhibits edema. He exhibits no tenderness. Lymphadenopathy:     He has no cervical adenopathy. Neurological: He is alert and oriented to person, place, and time. No cranial nerve deficit. Skin: Skin is warm and dry. No rash noted. He is not diaphoretic. No erythema. No pallor. Psychiatric: He has a normal mood and affect.  His behavior is normal. Judgment and thought content normal.     Vitals:    01/09/18 1501   BP: 122/80   Pulse: 84       Current Outpatient Prescriptions:     valsartan (DIOVAN) 80 MG tablet, Take 1 tablet by mouth daily, Disp: 30 tablet, Rfl: 11    hydrOXYzine (ATARAX) 25 MG tablet, Take 1 tablet by mouth 3 times daily as needed for Itching, Disp: 90 tablet, Rfl: 1    spironolactone (ALDACTONE) 50 MG tablet, Take 1 tablet by mouth daily, Disp: 30 tablet, Rfl: 5    potassium chloride (KLOR-CON M) 10 MEQ extended release tablet, Take 1 tablet by

## 2018-01-10 PROBLEM — A41.9 SEPSIS (HCC): Status: ACTIVE | Noted: 2018-01-01

## 2018-01-11 PROBLEM — I50.23 ACUTE ON CHRONIC SYSTOLIC CHF (CONGESTIVE HEART FAILURE) (HCC): Chronic | Status: ACTIVE | Noted: 2017-01-01

## 2018-01-16 NOTE — CARE COORDINATION
[facility] Transitions Initial Follow Up Call    Call within 2 business days of discharge: Yes    Patient: [facility] Patient : 1965   MRN: [id number]  Reason for Admission: pneumonia  Discharge Date: 1/15/18 RARS: [facility] Risk Score: 15.25     Initial 24 hr call attempted, contact info left on         Follow Up  Future Appointments  Date Time Provider Miguel Landrum   2018 10:30 AM Alessandra Spangler MD FF Cardio MMA   2018 12:45 PM Alessandra Spangler MD FF Cardio [facility]   2018 11:00 AM Amalia Avendano DO  Cardio MMA   3/6/2018 11:40 AM Afshan Salinas MD FF INFCT DIS MMA   3/23/2018 3:15 PM Sharda Pritchard MD [facility]   2018 3:15 PM Amanuel Rust MD PULM & CC [facility]       April Quinonez RN

## 2018-01-19 PROBLEM — I50.23 ACUTE ON CHRONIC SYSTOLIC CHF (CONGESTIVE HEART FAILURE) (HCC): Status: ACTIVE | Noted: 2017-01-01

## 2018-01-19 NOTE — PROGRESS NOTES
[address]  Advanced CHF/Pulmonary Hypertension   Cardiac Follow up       Bev Stephens  YOB: 1965    Date of Visit:  1/19/18      Chief Complaint   Patient presents with    Follow-Up from Hospital     Denies cardiac symptoms    Congestive Heart Failure    Hypertension    Cardiomyopathy     History of Present Illness:  Bev Stephens is a 47 y.o. male who presents in follow up for management of CHF. 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 He declined wearing his life vest because it gets in the way of him doing his job and sent it back to the company. He LVEF 20-25% on echo 10/17. He is here in follow up after admission for shortness of breath. He was placed on milrinone and given IV lasix. Today his weight was 225 pounds at discharge and has been 222 pounds  at home. While hospitalized, he diuresed 30-40 pounds and is here for follow up.he is feeling better, his abdomen went down in size and he states that he feels much better. He is spironolactone was stopped in the hospital because his potassium was up. He remains off of spironolactone. He states he does feel like he has some labored breathing with walking. He is now taking Lasix 80 mg twice a day. He is sleeping okay. He is strictly following low sodium and low fluid intake.     Allergies   Allergen Reactions    Phosphorus      Current Outpatient Prescriptions   Medication Sig Dispense Refill    amoxicillin-clavulanate (AUGMENTIN) 875-125 MG per tablet Take 1 tablet by mouth every 12 hours for 5 days 10 tablet 0    furosemide (LASIX) 80 MG tablet Take 1 tablet by mouth 2 times daily 60 tablet 3    valsartan (DIOVAN) 40 MG tablet Take 1 tablet by mouth daily 30 tablet 1    carvedilol (COREG) 3.125 MG tablet Take 1 tablet by mouth 2 times daily (with meals) 60 tablet 3    digoxin (LANOXIN) 125 MCG tablet Take 1 tablet by mouth daily 30 tablet 3 voiding, or hematuria. · Musculoskeletal:  No gait disturbance, weakness or joint complaints. · Integumentary: No rash or pruritis. · Neurological: No headache, diplopia, change in muscle strength, numbness or tingling. No change in gait, balance, coordination, mood, affect, memory, mentation, behavior. · Psychiatric: No anxiety, no depression. · Endocrine: No malaise, fatigue or temperature intolerance. No excessive thirst, fluid intake, or urination. No tremor. · Hematologic/Lymphatic: No abnormal bruising or bleeding, blood clots or swollen lymph nodes. · Allergic/Immunologic: No nasal congestion or hives. Physical Examination:      Wt Readings from Last 3 Encounters:   01/19/18 222 lb 1.9 oz (100.8 kg)   01/15/18 227 lb 14.4 oz (103.4 kg)   01/05/18 257 lb (116.6 kg)     BP Readings from Last 3 Encounters:   01/19/18 112/62   01/15/18 108/73   01/09/18 122/80     Constitutional and General Appearance:   WD/WN in NAD  HEENT:  NC/AT  JUN  No problems with hearing  Skin:  Warm, dry  Respiratory:  · Normal excursion and expansion without use of accessory muscles  · Resp Auscultation: Normal breath sounds without dullness  Cardiovascular:  · The apical impulses not displaced  · Heart tones are crisp and normal  · Cervical veins are not engorged  · The carotid upstroke is normal in amplitude and contour without delay or bruit  · JVP less than 8 cm H2O  RRR with nl S1 and S2 without m,r,g  · Peripheral pulses are symmetrical and full  · There is no clubbing, cyanosis of the extremities.   · BLE edema improving  · Femoral Arteries: 2+ and equal  · Pedal Pulses: 2+ and equal   Neck:  · No thyromegaly  Abdomen: increased abdominal girth consistent with ascites  · No masses or tenderness  · Liver/Spleen: No Abnormalities Noted  Neurological/Psychiatric:  · Alert and oriented in all spheres  · Moves all extremities well  · Exhibits normal gait balance and coordination  · No abnormalities of mood, affect, memory, mentation, or behavior are noted    Echo 8/22/17  Four chamber cardiac enlargement. Borderline concentric left ventricular hypertrophy is present. Global ejection fraction is decreased and estimated 40 %. There is mild hypokinesis of the basal inferior wall. There are increased E/A velocities consistent with restrictive diastolic  filling (Grade III) . E/e'= 11.45 . Mitral annular calcification is present. Moderate to severe mitral regurgitation is present. There is moderate tricuspid regurgitation with RVSP estimated at 46 mmHg. Dilated left atrium with a volume of 80 ml. The right heart is moderately enlarged. Right ventricular systolic function appears reduced . Assessment:    1. Systolic CHF, chronic (HCC) - stable on exam. Improving BLE edema. 2. Essential hypertension - /62 (Site: Right Arm, Position: Sitting, Cuff Size: Medium Adult)   Pulse 88   Resp 16   Ht 5' 10\" (1.778 m)   Wt 222 lb 1.9 oz (100.8 kg)   SpO2 98%   BMI 31.87 kg/m² stable BP and HR.     3. SOB (shortness of breath) -  improved. 4. Cardiomyopathy, unspecified type ([address] - Echo 8/22/17 EF 40%. 5. PAH (pulmonary arterial hypertension) with portal hypertension ([address] - Echo 8/22/17 showed RVSP 46 mmHg      Plan:  Increase coreg to 3.125 mg in the morning and 6.25 mg in the evening. Goal for coreg is 12.5 mg twice daily. Continue standing lab order for bmp,bnp next draw will be early next week Monday or Tuesday. Daily weight and call if 3 pounds in a day ot 5 pounds in a week. Don't  Goal is to keep fluid off. Maintain 2L fluid restriction. Maintain 3g sodium diet. Continue walking for exercise daily. Follow up in 2 weeks February 2nd 2018 @ 10:00. QUALITY MEASURES  1. Tobacco Cessation Counseling: NA  2. Retake of BP if >140/90:   NA  3. Documentation to PCP/referring for new patient:  Sent to PCP at close of office visit  4. CAD patient on anti-platelet: NA  5.  CAD patient on STATIN therapy:

## 2018-01-26 NOTE — PROGRESS NOTES
in muscle strength, numbness or tingling. No change in gait, balance, coordination, mood, affect, memory, mentation, behavior. · Psychiatric: No anxiety, no depression. · Endocrine: No malaise, fatigue or temperature intolerance. No excessive thirst, fluid intake, or urination. No tremor. · Hematologic/Lymphatic: No abnormal bruising or bleeding, blood clots or swollen lymph nodes. · Allergic/Immunologic: No nasal congestion or hives. Physical Examination:      Wt Readings from Last 3 Encounters:   01/19/18 222 lb 1.9 oz (100.8 kg)   01/15/18 227 lb 14.4 oz (103.4 kg)   01/05/18 257 lb (116.6 kg)     BP Readings from Last 3 Encounters:   01/19/18 112/62   01/15/18 108/73   01/09/18 122/80     Constitutional and General Appearance:   WD/WN in NAD  HEENT:  NC/AT  JUN  No problems with hearing  Skin:  Warm, dry  Respiratory:  · Normal excursion and expansion without use of accessory muscles  · Resp Auscultation: Normal breath sounds without dullness  Cardiovascular:  · The apical impulses not displaced  · Heart tones are crisp and normal  · Cervical veins are not engorged  · The carotid upstroke is normal in amplitude and contour without delay or bruit  · JVP less than 8 cm H2O  RRR with nl S1 and S2 without m,r,g  · Peripheral pulses are symmetrical and full  · There is no clubbing, cyanosis of the extremities. · BLE edema improving  · Femoral Arteries: 2+ and equal  · Pedal Pulses: 2+ and equal   Neck:  · No thyromegaly  Abdomen: increased abdominal girth consistent with ascites  · No masses or tenderness  · Liver/Spleen: No Abnormalities Noted  Neurological/Psychiatric:  · Alert and oriented in all spheres  · Moves all extremities well  · Exhibits normal gait balance and coordination  · No abnormalities of mood, affect, memory, mentation, or behavior are noted    Echo 8/22/17  Four chamber cardiac enlargement. Borderline concentric left ventricular hypertrophy is present.   Global ejection fraction is

## 2018-01-26 NOTE — PATIENT INSTRUCTIONS
Continue coreg to 3.125 mg in the morning and 6.25 mg in the evening. Goal for coreg is 12.5 mg twice daily. Continue standing lab order for bmp,bnp next draw today. Daily weight and call if 3 pounds in a day ot 5 pounds in a week. Don't  Goal is to keep fluid off. Maintain 2L fluid restriction. Maintain 3g sodium diet. Continue walking for exercise daily. Follow up in 2 weeks February 2nd 2018 @ 10:00.

## 2018-02-02 NOTE — PROGRESS NOTES
[facility]  Advanced CHF/Pulmonary Hypertension   Cardiac Follow up       Bev Stephens  YOB: 1965    Date of Visit:  2/2/18    Chief Complaint   Patient presents with    Congestive Heart Failure     No cardiac complaints       History of Present Illness:  Bev Stephens is a 47 y.o. male who presents in follow up for management of CHF. 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 He declined wearing his life vest because it gets in the way of him doing his job and sent it back to the company. He LVEF 20-25% on echo 10/17. He was admitted and placed on milrinone and given IV lasix and he diuresed 30-40 pounds. He felt better afterwards and was discharged home improved. Spironolactone was stopped in the hospital because his potassium was up. He remains off of spironolactone    He is here in follow up and reports that his weight has been 219 lbs at home and stable. His HR was low yesterday and held his coreg both doses. He did take huis BP medications in the evenings. His HR is at 60 bpm and he has not taken any of his medications this morning. He has been taking coreg 3.125 mg in the mornings and 6.25 mg in the evenings. We discussed him doing 3.125 mg twice daily instead to see if this helps with the way he feels. he feels sluggish with the current dose. Energy level is down with his slow HR. Abdomen size and leg swelling is down. We discussed his medications and why he is on them. He is now taking Lasix 80 mg twice a day, valsartan, digoxin, coreg. He is sleeping okay. He is strictly following low sodium and low fluid intake. We reviewed labs from 1/26/2017 looks good his potassium was high up to 6.1 ~ 2 weeks ago and it is down to 3.6 now, improved. We discussed in the future him possibly onsidering restarting spironolactone at a decreased dose.  He still think he may be loosing body weight except he does gain in the Used    Alcohol use No    Drug use: No    Sexual activity: Yes     Partners: Female     Other Topics Concern    Not on file     Social History Narrative    No narrative on file       Review of Systems:   · Constitutional: there has been no unanticipated weight loss. There's been no change in energy level, sleep pattern, or activity level. · Eyes: No visual changes or diplopia. No scleral icterus. · ENT: No Headaches, hearing loss or vertigo. No mouth sores or sore throat. · Cardiovascular: Reviewed in HPI  · Respiratory: No wheezing, no sputum production. No hematemesis. +occassinal cough, improved  · Gastrointestinal: No abdominal pain, appetite loss, blood in stools. No change in bowel or bladder habits. · Genitourinary: No dysuria, trouble voiding, or hematuria. · Musculoskeletal:  No gait disturbance, weakness or joint complaints. · Integumentary: No rash or pruritis. · Neurological: No headache, diplopia, change in muscle strength, numbness or tingling. No change in gait, balance, coordination, mood, affect, memory, mentation, behavior. · Psychiatric: No anxiety, no depression. · Endocrine: No malaise, fatigue or temperature intolerance. No excessive thirst, fluid intake, or urination. No tremor. · Hematologic/Lymphatic: No abnormal bruising or bleeding, blood clots or swollen lymph nodes. · Allergic/Immunologic: No nasal congestion or hives.     Physical Examination:      Wt Readings from Last 3 Encounters:   02/02/18 223 lb 11.2 oz (101.5 kg)   01/26/18 222 lb (100.7 kg)   01/19/18 222 lb 1.9 oz (100.8 kg)     BP Readings from Last 3 Encounters:   02/02/18 102/78   01/26/18 80/62   01/19/18 112/62     Constitutional and General Appearance:   WD/WN in NAD  HEENT:  NC/AT  JUN  No problems with hearing  Skin:  Warm, dry  Respiratory:  · Normal excursion and expansion without use of accessory muscles  · Resp Auscultation: Normal breath sounds without dullness  Cardiovascular:  · The apical

## 2018-02-20 PROBLEM — I50.9 CHRONIC CONGESTIVE HEART FAILURE (HCC): Status: ACTIVE | Noted: 2018-01-01

## 2018-02-23 NOTE — TELEPHONE ENCOUNTER
Pt calling to let MUSTAPHA know that his weight is done and may not need to come in for lasix infusion.  Please call to adv thank you

## 2018-03-01 NOTE — PROGRESS NOTES
[address]  Advanced CHF/Pulmonary Hypertension   Cardiac Follow up       Bev Stephens  YOB: 1965    Date of Visit:  3/1/18    Chief Complaint   Patient presents with    Congestive Heart Failure    Shortness of Breath       History of Present Illness:  Bev Stephens is a 47 y.o. male who presents in follow up for management of CHF. 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 He declined wearing his life vest because it gets in the way of him doing his job and sent it back to the company. He LVEF 20-25% on echo 10/17. He was admitted and placed on milrinone and given IV lasix and he diuresed 30-40 pounds. He felt better afterwards and was discharged home improved. Spironolactone was stopped in the hospital because his potassium was up. He remains off of spironolactone. Since discharge his weight went up an he has had IV lasix with good results but he had recurrent fluid and was treated with metolazone with good results. He is here in follow up and reports that his weight has been 219 lbs at home and stable over the past week, down about 10 pounds over the past 6 weeks. He has been dizzy and LH. His weight at home was 222 lbs at home. He drank 2 liters of water felt dehydration. Over the past week he has taken 2 doses of metolazone 2.5 mg. He continues lasix 80 mg twice daily, coreg 3.125 mfg,  1 in the morning and 2 at night. Still has a small amount of edema to ankles, he feels it's from th extra water he drank. He is feeling full and coughing and feels he needs to take another prn metolazone. We reviewed recent labs but has not had them done since metolazone. Has a standing order for labs. Appetite is good and sleeping well. He is consistent with monitoring food, sodium and fluid intake. He has been sleeping well uses only one pillow and lays flat.          Allergies   Allergen Reactions    Phosphorus     Topics Concern    Not on file     Social History Narrative    No narrative on file       Review of Systems:   · Constitutional: there has been no unanticipated weight loss. There's been no change in energy level, sleep pattern, or activity level. · Eyes: No visual changes or diplopia. No scleral icterus. · ENT: No Headaches, hearing loss or vertigo. No mouth sores or sore throat. · Cardiovascular: Reviewed in HPI  · Respiratory: No wheezing, no sputum production. No hematemesis. +occasional cough, improved  · Gastrointestinal: No abdominal pain, appetite loss, blood in stools. No change in bowel or bladder habits. · Genitourinary: No dysuria, trouble voiding, or hematuria. · Musculoskeletal:  No gait disturbance, weakness or joint complaints. · Integumentary: No rash or pruritis. · Neurological: No headache, diplopia, change in muscle strength, numbness or tingling. No change in gait, balance, coordination, mood, affect, memory, mentation, behavior. · Psychiatric: No anxiety, no depression. · Endocrine: No malaise, fatigue or temperature intolerance. No excessive thirst, fluid intake, or urination. No tremor. · Hematologic/Lymphatic: No abnormal bruising or bleeding, blood clots or swollen lymph nodes. · Allergic/Immunologic: No nasal congestion or hives.     Physical Examination:      Wt Readings from Last 3 Encounters:   02/02/18 223 lb 11.2 oz (101.5 kg)   01/26/18 222 lb (100.7 kg)   01/19/18 222 lb 1.9 oz (100.8 kg)     BP Readings from Last 3 Encounters:   02/20/18 108/84   02/02/18 102/78   01/26/18 80/62     Constitutional and General Appearance:   WD/WN in NAD  HEENT:  NC/AT  JUN  No problems with hearing  Skin:  Warm, dry  Respiratory:  · Normal excursion and expansion without use of accessory muscles  · Resp Auscultation: Normal breath sounds without dullness  Cardiovascular:  · The apical impulses not displaced  · Heart tones are crisp and normal  · Cervical veins are not

## 2018-03-14 NOTE — TELEPHONE ENCOUNTER
Spoke to patient and infusion center.  Mr. Yogesh Wray will come in at 2 pm for labs and lasix prior to OV with Dr. Xander Das

## 2018-03-14 NOTE — TELEPHONE ENCOUNTER
From: Cindy Saldaña     Sent: 3/13/2018 11:41 PM EDT       To: Gayathri Nicholas MD  Subject: Visit Vero Ordonez first and foremost I've gained 5 pounds in two days secondly. I am still not releasing fluids . I have not use the restroom in two days. I am laboring intently and I a experiencing short ness of breath. Could you have your staff to set up a visit to the injections center for a a intravenous shot of lacix. I need to release fluids to get this weight down. The fluid is centered around my stomach.   I have an appointment with Dr. Carol Hatfield on Wednesday it would be great if I could kill two birds with one stone visit the injection center as well as see Dr. Carol Hatfield while I am at the hospital.    Rosendo

## 2018-03-18 NOTE — PROGRESS NOTES
dry  · HEENT: Normocephalic, atraumatic, mucous membranes moist  · NECK: supple, JVP +12  · CAROTID: Normal upstroke, no bruits  · CARDIAC: regular rate and rhythm, normal S1S2, no murmur, rub, or gallop  · RESPIRATORY: without w/r/r b/l   · EXTREMITIES: +1 ankle edema b/l , +2 pulses bilaterally   · MUSCULOSKELETAL: No joint swelling or tenderness, no chest wall tenderness  · GASTROINTESTINAL: normal bowel sounds, distended with ascites     Labs:  Lab Review   Hospital Outpatient Visit on 03/14/2018   Component Date Value    Sodium 03/14/2018 136     Potassium 03/14/2018 4.0     Chloride 03/14/2018 93*    CO2 03/14/2018 27     Anion Gap 03/14/2018 16     Glucose 03/14/2018 119*    BUN 03/14/2018 44*    CREATININE 03/14/2018 1.6*    GFR Non- 03/14/2018 45*    GFR  03/14/2018 55*    Calcium 03/14/2018 9.4     Pro-BNP 03/14/2018 [facility] Outpatient Visit on 02/20/2018   Component Date Value    Pro-BNP 02/20/2018 4235*    Sodium 02/20/2018 135*    Potassium 02/20/2018 5.6*    Chloride 02/20/2018 97*    CO2 02/20/2018 25     Anion Gap 02/20/2018 13     Glucose 02/20/2018 135*    BUN 02/20/2018 27*    CREATININE 02/20/2018 1.2     GFR Non- 02/20/2018 >60     GFR  02/20/2018 >60     Calcium 02/20/2018 9.1    Admission on 01/09/2018, Discharged on 01/15/2018   No results displayed because visit has over 200 results. Imaging:  I have reviewed the below testing personally:    ECHO:  10/24/17  Summary   -Normal left ventricle size. There is concentric left ventricular   hypertrophy. Left ventricular function is reduced with ejection fraction   estimated at 20-25 %.    -Moderate mitral regurgitation is present.   -The left atrium is dilated.   -Trivial aortic regurgitation is present.   -The right atrium is dilated.   -There is moderate to severe tricuspid regurgitation with RVSP estimated at   39

## 2018-03-20 NOTE — LETTER
[facility] Infectious Disease  [address] [place] [id number]  Phone: [phone]  Fax: [phone]    Clair Perez MD        March 20, 2018     Blair Mcmillan, [address] [place] [address] [address]    Patient: Dai Urias  MR Number: [id number]  YOB: 1965  Date of Visit: 3/20/2018    Dear Dr. Blair Mcmillan: Thank you for the request for consultation for Rosendo Coreas to me for the Follow-up of pneumocystis jirovecii pneumonia. Below are the relevant portions of my assessment and plan of care. If you have questions, please do not hesitate to call me. I look forward to following Rosendo along with you.     Sincerely,        Clair Perez MD

## 2018-03-20 NOTE — PROGRESS NOTES
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 male patient, who was seen today in ID clinic for follow-up. History was obtained from chart review and the patient. The patient was seen today for above mentioned complaints. The patient is well known to me from previous visits. He has a history of CLL. He had a CT chest on August 22, 2017 which showed bilateral reticular nodular shadows and diffuse groundglass opacities. He had a bronchoscopy done on October 20, 2017 by Dr. Dashawn Ybarra and is BAL was positive for pneumocystis jirovecii PCR. He was initiated with Bactrim which he did not tolerate well due to renal dysfunction. Subsequently was treated with clindamycin and Primaquine. He did not tolerate that well and was treated with oral atovaquone. He comes today for follow-up. Past Medical History:   Past medical and surgical history was reviewed by me during this visit in detail. 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 He has no axillary adenopathy. Neurological: He is alert and oriented to person, place, and time. He exhibits normal muscle tone. Skin: Skin is warm and dry. No rash noted. He is not diaphoretic. No erythema. Psychiatric: He has a normal mood and affect. Nursing note and vitals reviewed. DATA:  All available lab data was reviewed by me during this visit    Last CBC:  Lab Results   Component Value Date    .8 (HH) 01/15/2018    HGB 11.7 (L) 01/15/2018    HCT 36.9 (L) 01/15/2018    MCV 94.7 01/15/2018     (L) 01/15/2018    LABLYMP 95 (H) 08/16/2017    MID 13.7 (H) 08/12/2015    GRAN 6.3 08/12/2015    LYMPHOPCT 96.0 01/09/2018    MIDPERCENT 22.4 08/16/2017    GRANULOCYTES 11.0 (L) 08/16/2017    RBC 3.89 (L) 01/15/2018    MCH 30.1 01/15/2018    MCHC 31.8 01/15/2018    RDW 24.2 (H) 01/15/2018       Last BMP:  Lab Results   Component Value Date     03/14/2018    K 4.0 03/14/2018    K 5.5 01/15/2018    CL 93 03/14/2018    CO2 27 03/14/2018    BUN 44 03/14/2018    CREATININE 1.6 03/14/2018    GLUCOSE 119 03/14/2018    GLUCOSE 91 04/12/2017    CALCIUM 9.4 03/14/2018        Hepatic Function Panel:   Lab Results   Component Value Date    ALKPHOS 29 01/09/2018    ALT 14 01/09/2018    AST 26 01/09/2018    PROT 6.2 01/09/2018    PROT 6.5 04/12/2017    BILITOT 3.2 01/09/2018    LABALBU 3.4 01/13/2018       Last CK: No results found for: CKTOTAL    Last ESR:  No results found for: SEDRATE    Last CRP:  No results found for: CRP      Imaging: All pertinent images and reports for the current visit were reviewed by me during this visit. Outside records:    Labs, Microbiology, Radiology and pertinent results from Care everywhere, if available, were reviewed as a part of the consultation. Allergies:   All allergies data was reviewed by me during this visit  Phosphorus and Sulfa antibiotics    Microbiology:   All available micro data was reviewed by me during pneumonia. He does not have any cough at the time. He denies any fever. He has abdominal distention and ascites due to his cardiomyopathy and systolic heart failure for which he is getting Lasix. He recently received IV Lasix. His renal function has slightly deteriorated after that with a serum creatinine going to 1.5. RECOMMENDATIONS:      Diagnostic Workup:    · He does not need any further labs from my standpoint  · Recommend close monitoring of his renal function    Antimicrobials:    · His respiratory issues are due to his CHF. He has been treated successfully for PCP pneumonia. · He will not need any further antimicrobials from my standpoint  · I recommended the he keeps close follow-up with cardiology and hematology  · Discussed the importance of staying compliant with his heart failure medications      Patient education and counseling:    · Discussed patient's condition and what to expect. All of the patient's questions were addressed in a satisfactory manner and patient verbalized understanding all instructions. Weight loss counseling:    Extensive weight loss counseling was done. It is important to set a realistic weight loss goal. First goal should be to avoid gaining more weight and staying at current weight (or within 5 percent). People at high risk of developing diabetes who are able to lose 5 percent of their body weight and maintain this weight will reduce their risk of developing diabetes by about 50 percent and reduce their blood pressure. Losing more than 15 percent of  body weight and staying at this weight is an extremely good result, even if you never reach your \"dream\" or \"ideal\" weight.     Lifestyle changes including changing eating habits, substituting excess carbohydrates with proteins, stress reduction, using self-help programs like Weight Watchers®, Overeaters Anonymous®, and Take Off Pounds Sensibly (TOPS)© , following DASH diet and increasing exercise or walking briskly daily for half hour to and hour 5-7 days a week was suggested among other measures. Information was given about various weight loss education programs and their websites like www.cdc.gov/healthyweight, www.choosemyplate.gov and www.health.gov/dietaryguidelines/    Follow-up:    · Follow-up with me in ID clinic when necessary      TIME SPENT TODAY:     - Spent over 18 minutes on visit (including interval history, physical exam, review of data including labs, cultures, imaging, development and implementation of treatment plan and coordination of care). - Over 50% of time spent with pt counseling and education. Please note that this chart was generated using Dragon dictation software. Although every effort was made to ensure the accuracy of this automated transcription, some errors in transcription may have occurred inadvertently. If you may need any clarification, please do not hesitate to contact me through EPIC or at the phone number provided below with my electronic signature. Thankyou for involving me in the care of your patient. If you have any additional questions, please do not hesitate to contact me.       Alexsandra Buchanan MD, MPH  3/20/2018, 6:13 PM  [facility] Infectious Disease   [address] [place] [address], [address]  Office: [phone]  Fax: [phone]

## 2018-03-23 PROBLEM — I50.22 SYSTOLIC CHF, CHRONIC (HCC): Chronic | Status: RESOLVED | Noted: 2017-01-01 | Resolved: 2018-01-01

## 2018-03-23 PROBLEM — R60.0 EDEMA OF BOTH LEGS: Status: RESOLVED | Noted: 2017-01-01 | Resolved: 2018-01-01

## 2018-03-23 PROBLEM — R60.0 LOCALIZED EDEMA: Status: RESOLVED | Noted: 2018-01-01 | Resolved: 2018-01-01

## 2018-03-23 PROBLEM — R05.9 COUGH: Status: RESOLVED | Noted: 2017-01-01 | Resolved: 2018-01-01

## 2018-03-23 PROBLEM — I34.0 NON-RHEUMATIC MITRAL REGURGITATION: Chronic | Status: RESOLVED | Noted: 2017-01-01 | Resolved: 2018-01-01

## 2018-03-23 PROBLEM — R42 DIZZINESS: Status: RESOLVED | Noted: 2017-01-01 | Resolved: 2018-01-01

## 2018-03-23 PROBLEM — A41.9 SEPSIS (HCC): Status: RESOLVED | Noted: 2018-01-01 | Resolved: 2018-01-01

## 2018-03-23 PROBLEM — J18.9 PNEUMONIA OF LEFT LUNG DUE TO INFECTIOUS ORGANISM: Status: RESOLVED | Noted: 2018-01-01 | Resolved: 2018-01-01

## 2018-03-23 NOTE — PROGRESS NOTES
SUBJECTIVE:  Patient ID: Bhupinder Clemens is a 47 y.o. y.o. male     HPI    Bhupinder Clemens returns for follow up of hypertension. Patient has been taking His medications as prescribed. Patient's blood pressure is  controlled. Side effects related to taking the medications include no medication side effects noted    Patient returns for follow up of hyperlipidemia. Patient has been taking His medications as prescribed. Patient's lipids are controlled. Side effects related to taking the medications include none. He has had clinical consequences related to hyperlipidemia including, but not limited to acute coronary syndrome, stroke or chronic kidney disease. This is the first office visit that he has had since his discharge from the hospital.  He was admitted with congestive heart failure and asthma/COPD. He was found to have pneumocystis on a bronchoscopy due to persistent shortness of breath. The pneumocystis was jerovecci. His hospitalization was complicated by acute renal failure as well. Review of Systems    OBJECTIVE:    /84   Pulse 56   Ht 5' 10\" (1.778 m)   Wt 234 lb (106.1 kg)   BMI 33.58 kg/m²    Physical Exam   Constitutional: He is oriented to person, place, and time. He appears well-developed and well-nourished. No distress. HENT:   Head: Normocephalic and atraumatic. Cardiovascular:   Murmur (Patient has a murmur that is loudest over the left sternal border radiating for C left axilla. It is a systolic murmur that is graded a 4/6.) heard. Regular rhythm with frequent premature ventricular contractions. Pulmonary/Chest: Effort normal and breath sounds normal. No respiratory distress. He has no wheezes. He has no rales. He exhibits no tenderness. Abdominal: Soft. Patient has ascites. He has no abdominal tenderness however. Neurological: He is alert and oriented to person, place, and time. No cranial nerve deficit. Skin: Skin is warm and dry. He is not diaphoretic. Psychiatric: He has a normal mood and affect. His behavior is normal. Judgment and thought content normal.   Vitals reviewed. Current Outpatient Prescriptions:     carvedilol (COREG) 6.25 MG tablet, Take 1 tablet by mouth 2 times daily (with meals), Disp: 60 tablet, Rfl: 3    metolazone (ZAROXOLYN) 2.5 MG tablet, Take 1 tablet by mouth daily, Disp: 10 tablet, Rfl: 0    furosemide (LASIX) 80 MG tablet, Take 1 tablet by mouth 2 times daily, Disp: 60 tablet, Rfl: 3    valsartan (DIOVAN) 40 MG tablet, Take 1 tablet by mouth daily (Patient taking differently: Take 40 mg by mouth nightly ), Disp: 30 tablet, Rfl: 1    digoxin (LANOXIN) 125 MCG tablet, Take 1 tablet by mouth daily (Patient taking differently: Take 125 mcg by mouth daily Takes every other day), Disp: 30 tablet, Rfl: 3    Assessment/Plan:  Rosendo was seen today for hypertension and hyperlipidemia. Diagnoses and all orders for this visit:    Atrial fibrillation, unspecified type ([address]  -     Cancel: EKG 12 lead  -     EKG 12 lead  -     EKG 12 lead    PAH (pulmonary arterial hypertension) with portal hypertension ([address]  Comments: This is a primary cause for the patient's ascites. Essential hypertension, benign  Comments:  Patient blood pressure is controlled today. Other hyperlipidemia  Comments:  Patient is not currently taking any medication for his hyperlipidemia. Chronic congestive heart failure, unspecified congestive heart failure type [facility])  Comments:  Patient is seeing cardiology for management of this problem.         Ana Sanches MD

## 2018-03-30 PROBLEM — I50.21 ACUTE SYSTOLIC CONGESTIVE HEART FAILURE (HCC): Status: ACTIVE | Noted: 2018-01-01

## 2018-03-30 PROBLEM — R60.1 ANASARCA: Status: ACTIVE | Noted: 2018-01-01

## 2018-03-30 PROBLEM — R18.8 ASCITES: Status: ACTIVE | Noted: 2018-01-01

## 2018-03-30 NOTE — PROGRESS NOTES
includes Eye surgery; bronchoscopy (10/20/2017); Colonoscopy; and Achilles tendon surgery. Social History:   reports that he has never smoked. He has never used smokeless tobacco. He reports that he does not drink alcohol or use drugs. Family History:   Family History   Problem Relation Age of Onset    Diabetes Mother     Hypertension Mother     Heart Disease Mother     Stroke Maternal Grandfather     Heart Disease Maternal Grandfather      CAD    Heart Disease Brother     No Known Problems Father        Home Medications:  Prior to Admission medications    Medication Sig Start Date End Date Taking? Authorizing Provider   metolazone (ZAROXOLYN) 2.5 MG tablet Take 1 tablet by mouth daily 3/29/18  Yes Ivan Tong MD   carvedilol (COREG) 6.25 MG tablet Take 1 tablet by mouth 2 times daily (with meals) 3/1/18  Yes Ivan Tong MD   furosemide (LASIX) 80 MG tablet Take 1 tablet by mouth 2 times daily 1/15/18  Yes Yumi Ware NP   valsartan (DIOVAN) 40 MG tablet Take 1 tablet by mouth daily  Patient taking differently: Take 40 mg by mouth nightly  1/15/18  Yes Tammy Addison PA-C   digoxin (LANOXIN) 125 MCG tablet Take 1 tablet by mouth daily  Patient taking differently: Take 125 mcg by mouth daily Takes every other day 10/30/17  Yes Amy Sagastume MD        Allergies:  Phosphorus and Sulfa antibiotics     Review of Systems:   · Constitutional: there has been no unanticipated weight loss. There's been no change in energy level, sleep pattern, or activity level. · Eyes: No visual changes or diplopia. No scleral icterus. · ENT: No Headaches, hearing loss or vertigo. No mouth sores or sore throat. · Cardiovascular: Reviewed in HPI  · Respiratory: No cough or wheezing, no sputum production. No hematemesis. sob  · Gastrointestinal: No abdominal pain, appetite loss, blood in stools. No change in bowel or bladder habits. abdominal distention  · Genitourinary: No dysuria, trouble voiding, or is decreased and estimated 40 %. -There is mild hypokinesis of the basal inferior wall.   -There are increased E/A velocities consistent with restrictive diastolic   filling (Grade III) . E/e'= 11.45 .   -Mitral annular calcification is present.   -Moderate to severe mitral regurgitation is present.   -There is moderate tricuspid regurgitation with RVSP estimated at 46 mmHg.   -Dilated left atrium with a volume of 80 ml.   -The right heart is moderately enlarged.   -Right ventricular systolic function appears reduced     Assessment:    1. Acute systolic heart failure (HCC) fluid overloaded, on ace, BB; no aldactone antagonist due to hyperkalemia   2. SOB (shortness of breath)    3. Hypervolemia, unspecified hypervolemia type    4. Essential hypertension, benign controlled   5. NICM (nonischemic cardiomyopathy) ([facility] [address]        Plan:  He is fluid overloaded with elevated proBNP and creat of 2.7  Discussed with Dr Dareen Mcburney and will admit patient to the hospital for milrinone infusion. Report given to hospitalist  Will check digoxin level as hr is 48    >40 minutes of time spent on admission to hospital including plan of care, patient education and care coordination. Wife and patient agree to plan    I appreciate the opportunity of cooperating in the care of this individual.    PAUL Ye, 3/30/2018, 1:43 PM    QUALITY MEASURES  1. Tobacco Cessation Counseling: NA  2. Retake of BP if >140/90:   NA  3. Documentation to PCP/referring for new patient:  Sent to PCP at close of office visit  4. CAD patient on anti-platelet: NA  5. CAD patient on STATIN therapy:  NA  6.  Patient with CHF and aFib on anticoagulation:  NA

## 2022-02-10 NOTE — PATIENT INSTRUCTIONS
Get blood work before Dr. Salvatore Michael kei't 12/27/17 (paper order AND other orders in EPIC). Liver ultrasound scheduled. Referral was faxed to Dr. Gerardo Johnson office for liver evaluation. If you don't hear from them by next week, call them at [phone]. Office in [place] on [address]. Otezla Counseling: The side effects of Otezla were discussed with the patient, including but not limited to worsening or new depression, weight loss, diarrhea, nausea, upper respiratory tract infection, and headache. Patient instructed to call the office should any adverse effect occur.  The patient verbalized understanding of the proper use and possible adverse effects of Otezla.  All the patient's questions and concerns were addressed.